# Patient Record
Sex: MALE | Race: WHITE | Employment: OTHER | URBAN - METROPOLITAN AREA
[De-identification: names, ages, dates, MRNs, and addresses within clinical notes are randomized per-mention and may not be internally consistent; named-entity substitution may affect disease eponyms.]

---

## 2021-08-09 ENCOUNTER — HOSPITAL ENCOUNTER (INPATIENT)
Age: 66
LOS: 2 days | Discharge: HOME | DRG: 156 | End: 2021-08-11
Attending: INTERNAL MEDICINE
Payer: COMMERCIAL

## 2021-08-09 DIAGNOSIS — L03.818 CELLULITIS OF OTHER SPECIFIED SITE: Primary | ICD-10-CM

## 2021-08-09 PROBLEM — L03.90 CELLULITIS: Status: ACTIVE | Noted: 2021-08-09

## 2021-08-09 LAB
ALBUMIN SERPL-MCNC: 4.4 G/DL (ref 3.5–5.2)
ALP BLD-CCNC: 101 U/L (ref 40–129)
ALT SERPL-CCNC: 22 U/L (ref 0–40)
ANION GAP SERPL CALCULATED.3IONS-SCNC: 14 MMOL/L (ref 7–16)
AST SERPL-CCNC: 33 U/L (ref 0–39)
BASOPHILS ABSOLUTE: 0.06 E9/L (ref 0–0.2)
BASOPHILS RELATIVE PERCENT: 0.5 % (ref 0–2)
BILIRUB SERPL-MCNC: 1 MG/DL (ref 0–1.2)
BUN BLDV-MCNC: 9 MG/DL (ref 6–23)
CALCIUM SERPL-MCNC: 9.3 MG/DL (ref 8.6–10.2)
CHLORIDE BLD-SCNC: 99 MMOL/L (ref 98–107)
CO2: 22 MMOL/L (ref 22–29)
CREAT SERPL-MCNC: 1.1 MG/DL (ref 0.7–1.2)
EOSINOPHILS ABSOLUTE: 0.12 E9/L (ref 0.05–0.5)
EOSINOPHILS RELATIVE PERCENT: 1 % (ref 0–6)
GFR AFRICAN AMERICAN: >60
GFR NON-AFRICAN AMERICAN: >60 ML/MIN/1.73
GLUCOSE BLD-MCNC: 129 MG/DL (ref 74–99)
HCT VFR BLD CALC: 46.8 % (ref 37–54)
HEMOGLOBIN: 16.6 G/DL (ref 12.5–16.5)
IMMATURE GRANULOCYTES #: 0.05 E9/L
IMMATURE GRANULOCYTES %: 0.4 % (ref 0–5)
LYMPHOCYTES ABSOLUTE: 1.64 E9/L (ref 1.5–4)
LYMPHOCYTES RELATIVE PERCENT: 13.9 % (ref 20–42)
MCH RBC QN AUTO: 31.9 PG (ref 26–35)
MCHC RBC AUTO-ENTMCNC: 35.5 % (ref 32–34.5)
MCV RBC AUTO: 90 FL (ref 80–99.9)
MONOCYTES ABSOLUTE: 0.64 E9/L (ref 0.1–0.95)
MONOCYTES RELATIVE PERCENT: 5.4 % (ref 2–12)
NEUTROPHILS ABSOLUTE: 9.29 E9/L (ref 1.8–7.3)
NEUTROPHILS RELATIVE PERCENT: 78.8 % (ref 43–80)
PDW BLD-RTO: 12.4 FL (ref 11.5–15)
PLATELET # BLD: 233 E9/L (ref 130–450)
PMV BLD AUTO: 10.1 FL (ref 7–12)
POTASSIUM SERPL-SCNC: 4.3 MMOL/L (ref 3.5–5)
RBC # BLD: 5.2 E12/L (ref 3.8–5.8)
SODIUM BLD-SCNC: 135 MMOL/L (ref 132–146)
TOTAL PROTEIN: 7.6 G/DL (ref 6.4–8.3)
WBC # BLD: 11.8 E9/L (ref 4.5–11.5)

## 2021-08-09 PROCEDURE — 96366 THER/PROPH/DIAG IV INF ADDON: CPT

## 2021-08-09 PROCEDURE — 6360000002 HC RX W HCPCS: Performed by: INTERNAL MEDICINE

## 2021-08-09 PROCEDURE — 85025 COMPLETE CBC W/AUTO DIFF WBC: CPT

## 2021-08-09 PROCEDURE — 96372 THER/PROPH/DIAG INJ SC/IM: CPT

## 2021-08-09 PROCEDURE — 87040 BLOOD CULTURE FOR BACTERIA: CPT

## 2021-08-09 PROCEDURE — 80053 COMPREHEN METABOLIC PANEL: CPT

## 2021-08-09 PROCEDURE — 2580000003 HC RX 258: Performed by: INTERNAL MEDICINE

## 2021-08-09 PROCEDURE — 1200000000 HC SEMI PRIVATE

## 2021-08-09 PROCEDURE — 96365 THER/PROPH/DIAG IV INF INIT: CPT

## 2021-08-09 PROCEDURE — G0378 HOSPITAL OBSERVATION PER HR: HCPCS

## 2021-08-09 PROCEDURE — G0379 DIRECT REFER HOSPITAL OBSERV: HCPCS

## 2021-08-09 PROCEDURE — 36415 COLL VENOUS BLD VENIPUNCTURE: CPT

## 2021-08-09 PROCEDURE — 6370000000 HC RX 637 (ALT 250 FOR IP): Performed by: INTERNAL MEDICINE

## 2021-08-09 RX ORDER — ONDANSETRON 2 MG/ML
4 INJECTION INTRAMUSCULAR; INTRAVENOUS EVERY 6 HOURS PRN
Status: DISCONTINUED | OUTPATIENT
Start: 2021-08-09 | End: 2021-08-11 | Stop reason: HOSPADM

## 2021-08-09 RX ORDER — ACETAMINOPHEN 325 MG/1
650 TABLET ORAL EVERY 6 HOURS PRN
Status: DISCONTINUED | OUTPATIENT
Start: 2021-08-09 | End: 2021-08-11 | Stop reason: HOSPADM

## 2021-08-09 RX ORDER — METOPROLOL SUCCINATE 100 MG/1
200 TABLET, EXTENDED RELEASE ORAL DAILY
COMMUNITY

## 2021-08-09 RX ORDER — LEVOFLOXACIN 500 MG/1
500 TABLET, FILM COATED ORAL DAILY
Status: ON HOLD | COMMUNITY
End: 2021-08-11 | Stop reason: HOSPADM

## 2021-08-09 RX ORDER — SODIUM CHLORIDE 0.9 % (FLUSH) 0.9 %
5-40 SYRINGE (ML) INJECTION EVERY 12 HOURS SCHEDULED
Status: DISCONTINUED | OUTPATIENT
Start: 2021-08-09 | End: 2021-08-11 | Stop reason: HOSPADM

## 2021-08-09 RX ORDER — TRAMADOL HYDROCHLORIDE 50 MG/1
50 TABLET ORAL EVERY 6 HOURS PRN
COMMUNITY
End: 2021-09-17

## 2021-08-09 RX ORDER — SODIUM CHLORIDE 9 MG/ML
25 INJECTION, SOLUTION INTRAVENOUS PRN
Status: DISCONTINUED | OUTPATIENT
Start: 2021-08-09 | End: 2021-08-11 | Stop reason: HOSPADM

## 2021-08-09 RX ORDER — POLYETHYLENE GLYCOL 3350 17 G/17G
17 POWDER, FOR SOLUTION ORAL DAILY PRN
Status: DISCONTINUED | OUTPATIENT
Start: 2021-08-09 | End: 2021-08-11 | Stop reason: HOSPADM

## 2021-08-09 RX ORDER — ACETAMINOPHEN 650 MG/1
650 SUPPOSITORY RECTAL EVERY 6 HOURS PRN
Status: DISCONTINUED | OUTPATIENT
Start: 2021-08-09 | End: 2021-08-11 | Stop reason: HOSPADM

## 2021-08-09 RX ORDER — ONDANSETRON 4 MG/1
4 TABLET, ORALLY DISINTEGRATING ORAL EVERY 8 HOURS PRN
Status: DISCONTINUED | OUTPATIENT
Start: 2021-08-09 | End: 2021-08-11 | Stop reason: HOSPADM

## 2021-08-09 RX ORDER — SODIUM CHLORIDE 0.9 % (FLUSH) 0.9 %
5-40 SYRINGE (ML) INJECTION PRN
Status: DISCONTINUED | OUTPATIENT
Start: 2021-08-09 | End: 2021-08-11 | Stop reason: HOSPADM

## 2021-08-09 RX ADMIN — Medication 10 ML: at 20:44

## 2021-08-09 RX ADMIN — ENOXAPARIN SODIUM 40 MG: 40 INJECTION SUBCUTANEOUS at 18:45

## 2021-08-09 RX ADMIN — VANCOMYCIN HYDROCHLORIDE 2500 MG: 5 INJECTION, POWDER, LYOPHILIZED, FOR SOLUTION INTRAVENOUS at 20:43

## 2021-08-09 RX ADMIN — ACETAMINOPHEN 650 MG: 325 TABLET ORAL at 21:15

## 2021-08-09 ASSESSMENT — PAIN SCALES - GENERAL
PAINLEVEL_OUTOF10: 7
PAINLEVEL_OUTOF10: 4

## 2021-08-09 ASSESSMENT — PAIN DESCRIPTION - PROGRESSION: CLINICAL_PROGRESSION: GRADUALLY WORSENING

## 2021-08-09 ASSESSMENT — PAIN DESCRIPTION - PAIN TYPE: TYPE: ACUTE PAIN

## 2021-08-09 ASSESSMENT — PAIN DESCRIPTION - ONSET: ONSET: ON-GOING

## 2021-08-09 ASSESSMENT — PAIN DESCRIPTION - FREQUENCY: FREQUENCY: CONTINUOUS

## 2021-08-09 ASSESSMENT — PAIN DESCRIPTION - ORIENTATION: ORIENTATION: RIGHT

## 2021-08-09 ASSESSMENT — PAIN - FUNCTIONAL ASSESSMENT: PAIN_FUNCTIONAL_ASSESSMENT: ACTIVITIES ARE NOT PREVENTED

## 2021-08-09 ASSESSMENT — PAIN DESCRIPTION - DESCRIPTORS: DESCRIPTORS: THROBBING

## 2021-08-09 ASSESSMENT — PAIN DESCRIPTION - LOCATION: LOCATION: EAR

## 2021-08-09 NOTE — PROGRESS NOTES
-Consulted to dose vancomycin for skin and soft tissue infection (of ear). -Serum creatinine not available at this time, however, loading dose of vancomycin 2500 mg IV x 1 would still be empirically appropriate.  -Will follow-up serum creatinine and renal function on 8/10.  -Even if renal function is normal, patient would not require additional vancomycin dosing prior to 1000 on 8/10.

## 2021-08-10 LAB
ALBUMIN SERPL-MCNC: 4.3 G/DL (ref 3.5–5.2)
ALP BLD-CCNC: 93 U/L (ref 40–129)
ALT SERPL-CCNC: 19 U/L (ref 0–40)
ANION GAP SERPL CALCULATED.3IONS-SCNC: 9 MMOL/L (ref 7–16)
ANTISTREPTOLYSIN-O: 45 IU/ML (ref 0–200)
AST SERPL-CCNC: 30 U/L (ref 0–39)
BASOPHILS ABSOLUTE: 0.07 E9/L (ref 0–0.2)
BASOPHILS RELATIVE PERCENT: 0.7 % (ref 0–2)
BILIRUB SERPL-MCNC: 1.3 MG/DL (ref 0–1.2)
BUN BLDV-MCNC: 8 MG/DL (ref 6–23)
C-REACTIVE PROTEIN: 0.4 MG/DL (ref 0–0.4)
CALCIUM SERPL-MCNC: 9.3 MG/DL (ref 8.6–10.2)
CHLORIDE BLD-SCNC: 102 MMOL/L (ref 98–107)
CO2: 26 MMOL/L (ref 22–29)
CREAT SERPL-MCNC: 1 MG/DL (ref 0.7–1.2)
EOSINOPHILS ABSOLUTE: 0.24 E9/L (ref 0.05–0.5)
EOSINOPHILS RELATIVE PERCENT: 2.5 % (ref 0–6)
GFR AFRICAN AMERICAN: >60
GFR NON-AFRICAN AMERICAN: >60 ML/MIN/1.73
GLUCOSE BLD-MCNC: 104 MG/DL (ref 74–99)
HCT VFR BLD CALC: 47.3 % (ref 37–54)
HEMOGLOBIN: 16.3 G/DL (ref 12.5–16.5)
IMMATURE GRANULOCYTES #: 0.04 E9/L
IMMATURE GRANULOCYTES %: 0.4 % (ref 0–5)
LYMPHOCYTES ABSOLUTE: 2.49 E9/L (ref 1.5–4)
LYMPHOCYTES RELATIVE PERCENT: 26 % (ref 20–42)
MCH RBC QN AUTO: 31.7 PG (ref 26–35)
MCHC RBC AUTO-ENTMCNC: 34.5 % (ref 32–34.5)
MCV RBC AUTO: 92 FL (ref 80–99.9)
MONOCYTES ABSOLUTE: 0.9 E9/L (ref 0.1–0.95)
MONOCYTES RELATIVE PERCENT: 9.4 % (ref 2–12)
NEUTROPHILS ABSOLUTE: 5.85 E9/L (ref 1.8–7.3)
NEUTROPHILS RELATIVE PERCENT: 61 % (ref 43–80)
PDW BLD-RTO: 12.4 FL (ref 11.5–15)
PLATELET # BLD: 215 E9/L (ref 130–450)
PMV BLD AUTO: 10.1 FL (ref 7–12)
POTASSIUM REFLEX MAGNESIUM: 4.3 MMOL/L (ref 3.5–5)
RBC # BLD: 5.14 E12/L (ref 3.8–5.8)
SEDIMENTATION RATE, ERYTHROCYTE: 1 MM/HR (ref 0–15)
SODIUM BLD-SCNC: 137 MMOL/L (ref 132–146)
TOTAL PROTEIN: 6.9 G/DL (ref 6.4–8.3)
WBC # BLD: 9.6 E9/L (ref 4.5–11.5)

## 2021-08-10 PROCEDURE — 85025 COMPLETE CBC W/AUTO DIFF WBC: CPT

## 2021-08-10 PROCEDURE — 80053 COMPREHEN METABOLIC PANEL: CPT

## 2021-08-10 PROCEDURE — 87070 CULTURE OTHR SPECIMN AEROBIC: CPT

## 2021-08-10 PROCEDURE — 96375 TX/PRO/DX INJ NEW DRUG ADDON: CPT

## 2021-08-10 PROCEDURE — 1200000000 HC SEMI PRIVATE

## 2021-08-10 PROCEDURE — 96376 TX/PRO/DX INJ SAME DRUG ADON: CPT

## 2021-08-10 PROCEDURE — 96372 THER/PROPH/DIAG INJ SC/IM: CPT

## 2021-08-10 PROCEDURE — 87205 SMEAR GRAM STAIN: CPT

## 2021-08-10 PROCEDURE — 86060 ANTISTREPTOLYSIN O TITER: CPT

## 2021-08-10 PROCEDURE — 6360000002 HC RX W HCPCS: Performed by: INTERNAL MEDICINE

## 2021-08-10 PROCEDURE — G0378 HOSPITAL OBSERVATION PER HR: HCPCS

## 2021-08-10 PROCEDURE — 6370000000 HC RX 637 (ALT 250 FOR IP): Performed by: OTOLARYNGOLOGY

## 2021-08-10 PROCEDURE — 6370000000 HC RX 637 (ALT 250 FOR IP): Performed by: NURSE PRACTITIONER

## 2021-08-10 PROCEDURE — 86140 C-REACTIVE PROTEIN: CPT

## 2021-08-10 PROCEDURE — 6370000000 HC RX 637 (ALT 250 FOR IP): Performed by: INTERNAL MEDICINE

## 2021-08-10 PROCEDURE — 2580000003 HC RX 258: Performed by: INTERNAL MEDICINE

## 2021-08-10 PROCEDURE — 6360000002 HC RX W HCPCS: Performed by: SPECIALIST

## 2021-08-10 PROCEDURE — 85651 RBC SED RATE NONAUTOMATED: CPT

## 2021-08-10 PROCEDURE — 36415 COLL VENOUS BLD VENIPUNCTURE: CPT

## 2021-08-10 RX ORDER — METOPROLOL SUCCINATE 100 MG/1
200 TABLET, EXTENDED RELEASE ORAL DAILY
Status: DISCONTINUED | OUTPATIENT
Start: 2021-08-10 | End: 2021-08-11 | Stop reason: HOSPADM

## 2021-08-10 RX ORDER — TRAMADOL HYDROCHLORIDE 50 MG/1
50 TABLET ORAL EVERY 6 HOURS PRN
Status: DISCONTINUED | OUTPATIENT
Start: 2021-08-10 | End: 2021-08-10

## 2021-08-10 RX ORDER — OXYCODONE HYDROCHLORIDE AND ACETAMINOPHEN 5; 325 MG/1; MG/1
1 TABLET ORAL EVERY 4 HOURS PRN
Status: DISCONTINUED | OUTPATIENT
Start: 2021-08-10 | End: 2021-08-10

## 2021-08-10 RX ORDER — TRAMADOL HYDROCHLORIDE 50 MG/1
50 TABLET ORAL EVERY 6 HOURS PRN
Status: DISCONTINUED | OUTPATIENT
Start: 2021-08-10 | End: 2021-08-11 | Stop reason: HOSPADM

## 2021-08-10 RX ORDER — LATANOPROST 50 UG/ML
1 SOLUTION/ DROPS OPHTHALMIC NIGHTLY
Status: DISCONTINUED | OUTPATIENT
Start: 2021-08-10 | End: 2021-08-11 | Stop reason: HOSPADM

## 2021-08-10 RX ORDER — OXYCODONE HYDROCHLORIDE AND ACETAMINOPHEN 5; 325 MG/1; MG/1
1 TABLET ORAL EVERY 4 HOURS PRN
Status: DISCONTINUED | OUTPATIENT
Start: 2021-08-10 | End: 2021-08-11 | Stop reason: HOSPADM

## 2021-08-10 RX ORDER — LATANOPROST 50 UG/ML
1 SOLUTION/ DROPS OPHTHALMIC NIGHTLY
COMMUNITY

## 2021-08-10 RX ADMIN — LATANOPROST 1 DROP: 50 SOLUTION OPHTHALMIC at 19:43

## 2021-08-10 RX ADMIN — TRAMADOL HYDROCHLORIDE 50 MG: 50 TABLET, FILM COATED ORAL at 15:50

## 2021-08-10 RX ADMIN — Medication 2000 MG: at 16:42

## 2021-08-10 RX ADMIN — Medication 10 ML: at 19:43

## 2021-08-10 RX ADMIN — MUPIROCIN: 20 OINTMENT TOPICAL at 19:42

## 2021-08-10 RX ADMIN — MUPIROCIN: 20 OINTMENT TOPICAL at 13:44

## 2021-08-10 RX ADMIN — TRAMADOL HYDROCHLORIDE 50 MG: 50 TABLET, FILM COATED ORAL at 09:24

## 2021-08-10 RX ADMIN — ACETAMINOPHEN 650 MG: 325 TABLET ORAL at 22:38

## 2021-08-10 RX ADMIN — OXYCODONE HYDROCHLORIDE AND ACETAMINOPHEN 1 TABLET: 5; 325 TABLET ORAL at 19:42

## 2021-08-10 RX ADMIN — Medication 10 ML: at 09:24

## 2021-08-10 RX ADMIN — METOPROLOL SUCCINATE 200 MG: 100 TABLET, EXTENDED RELEASE ORAL at 09:24

## 2021-08-10 RX ADMIN — ENOXAPARIN SODIUM 40 MG: 40 INJECTION SUBCUTANEOUS at 09:24

## 2021-08-10 RX ADMIN — Medication 2000 MG: at 09:31

## 2021-08-10 RX ADMIN — ACETAMINOPHEN 650 MG: 325 TABLET ORAL at 04:30

## 2021-08-10 ASSESSMENT — PAIN DESCRIPTION - PROGRESSION
CLINICAL_PROGRESSION: NOT CHANGED
CLINICAL_PROGRESSION: GRADUALLY WORSENING

## 2021-08-10 ASSESSMENT — PAIN DESCRIPTION - ONSET: ONSET: ON-GOING

## 2021-08-10 ASSESSMENT — PAIN SCALES - GENERAL
PAINLEVEL_OUTOF10: 0
PAINLEVEL_OUTOF10: 4
PAINLEVEL_OUTOF10: 7
PAINLEVEL_OUTOF10: 10
PAINLEVEL_OUTOF10: 3
PAINLEVEL_OUTOF10: 10
PAINLEVEL_OUTOF10: 7

## 2021-08-10 ASSESSMENT — PAIN DESCRIPTION - PAIN TYPE: TYPE: ACUTE PAIN

## 2021-08-10 ASSESSMENT — PAIN DESCRIPTION - LOCATION: LOCATION: EAR

## 2021-08-10 ASSESSMENT — PAIN DESCRIPTION - ORIENTATION: ORIENTATION: RIGHT

## 2021-08-10 ASSESSMENT — PAIN DESCRIPTION - DESCRIPTORS: DESCRIPTORS: THROBBING

## 2021-08-10 ASSESSMENT — PAIN DESCRIPTION - FREQUENCY: FREQUENCY: CONTINUOUS

## 2021-08-10 ASSESSMENT — PAIN - FUNCTIONAL ASSESSMENT: PAIN_FUNCTIONAL_ASSESSMENT: ACTIVITIES ARE NOT PREVENTED

## 2021-08-10 NOTE — PROGRESS NOTES
Pharmacy Consultation Note  (Antibiotic Dosing and Monitoring)    Note vancomycin discontinued. Clinical pharmacy will sign-off. Please re-consult if we can be of further assistance.     Thanks for this consult,  Cindi Lal, [address] [place] [address], PharmD, BCPS  8/10/2021  8:51 AM

## 2021-08-10 NOTE — CARE COORDINATION
Social Work / Discharge Planning : SW met with patient and explained role as discharge planner/ transition of care. Patient admitted with Cellulitis of ear. AWait ID consult and plan. Patient verified plan at discharge is HOME where he resides independently with sp[ouse. [address] vs [facility]fusion [facility] if needed at discharge discussed. He prefers [facility]. [address] choices dicussed and he does NOT have a preference. REferral to [facility]. Patient PCP is DR Dennys Vieyra and he uses [facility]. AWait plan. SW to follow.  Electronically signed by ISMAEL Hayes on 8/10/2021 at 9:10 AM

## 2021-08-10 NOTE — PATIENT CARE CONFERENCE
P Quality Flow/Interdisciplinary Rounds Progress Note        Quality Flow Rounds held on August 10, 2021    Disciplines Attending:  Bedside Nurse, ,  and Nursing Unit Leadership    Cindy Xavier was admitted on 8/9/2021  4:22 PM    Anticipated Discharge Date:  Expected Discharge Date: 08/11/21    Disposition:    Ricki Score:  Ricki Scale Score: 21    Readmission Risk              Risk of Unplanned Readmission:  6           Discussed patient goal for the day, patient clinical progression, and barriers to discharge.   The following Goal(s) of the Day/Commitment(s) have been identified:  ID consult       Omar Griggs RN  August 10, 2021

## 2021-08-10 NOTE — PROGRESS NOTES
over right auricular lesion. Needle aspiration of the inferior component, no pus. Attempted to express purulence from the superior aspect in the office yesterday, no fluid. Will re-eval daily. Per thought from Plastics, I&D?  Will consider if no relief from antibiotic therapy

## 2021-08-10 NOTE — PROGRESS NOTES
Spoke w/Dr. Maxx Jimenez  He's direct admitting patient from ENT clinic for spontaneous ear cellulitis / chondritis  No prior history of chondritis  Afebrile, AVSS  He is requesting ID be involved

## 2021-08-10 NOTE — HOME CARE
Referral received for home health possible home iv's. Pricing as follow:     Ordered therapy at time of quote: CEFAZOLIN 2GM IV Q8H  Coverage: 75%  Total pt responsibility: $39.73/DAY    Patient agrees with pricing. If home health is needed patient will need home iv script faxed to [phone] and home health orders placed in Epic prior to discharge.      Thank you, [facility]

## 2021-08-10 NOTE — H&P
[address] [facility]  History and Physical      CHIEF COMPLAINT:    Cellulitis    Patient of Maria T Barber MD presents with:  Cellulitis  History of Present Illness:   Patient is a 81-year-old male who presents with cellulitis of the right ear. Patient states this began 4 weeks ago as a small red pimple that progressively got worse. Patient identifies no aggravating or relieving factors. Patient was seen twice by his PCP and was on 2 different antibiotics however symptoms progressively got worse. Infectious disease and otolaryngology following. Cultures pending. At present, patient is resting in bed, complains of pain in the right ear. Purulent drainage noted from right ear. Patient denies chest pain, shortness of breath, fever, chills, or abdominal pain. REVIEW OF SYSTEMS:  Pertinent negatives are above in HPI. 10 point ROS otherwise negative. Past Medical History:   Diagnosis Date    Hypertension          Past Surgical History:   Procedure Laterality Date    HERNIA REPAIR  1977       Medications Prior to Admission:    Medications Prior to Admission: latanoprost (XALATAN) 0.005 % ophthalmic solution, Place 1 drop into both eyes nightly  metoprolol succinate (TOPROL XL) 100 MG extended release tablet, Take 200 mg by mouth daily In am  levoFLOXacin (LEVAQUIN) 500 MG tablet, Take 500 mg by mouth daily For current infection (prescribed by PCP)  traMADol (ULTRAM) 50 MG tablet, Take 50 mg by mouth every 6 hours as needed for Pain. For pain d/t current infection (prescribed by PCP)    Note that the patient's home medications were reviewed and the above list is accurate to the best of my knowledge at the time of the exam.    Allergies:    Patient has no known allergies. Social History:    reports that he has never smoked. He has never used smokeless tobacco. He reports current alcohol use of about 1.0 standard drinks of alcohol per week.  He reports that he does not use drugs. Family History:   family history is not on file. PHYSICAL EXAM:    Vitals:  BP (!) 122/91   Pulse 83   Temp 98.2 °F (36.8 °C) (Oral)   Resp 18   Ht 5' 10\" (1.778 m)   Wt 227 lb 5 oz (103.1 kg)   SpO2 96%   BMI 32.62 kg/m²       General appearance: NAD, conversant  Eyes: Sclerae anicteric, PERRLA  HEENT: R ear tenderness, swollen-cellulitis  Neck: FROM, supple, no thyromegaly  Lymph: No cervical / supraclavicular lymphadenopathy  Lungs: Clear to auscultation, WOB normal  CV: RRR, no MRGs, no lower extremity edema  Abdomen: Soft, non-tender; no masses or HSM, +BS  Extremities: FROM without synovitis. No clubbing or cyanosis of the hands. Skin: no rash, induration, lesions, or ulcers  Psych: Calm and cooperative. Normal judgement and insight. Normal mood and affect. Neuro: Alert and interactive, face symmetric, speech fluent. LABS:  All labs reviewed. Of note:  CBC with Differential:    Lab Results   Component Value Date    WBC 9.6 08/10/2021    RBC 5.14 08/10/2021    HGB 16.3 08/10/2021    HCT 47.3 08/10/2021     08/10/2021    MCV 92.0 08/10/2021    MCH 31.7 08/10/2021    MCHC 34.5 08/10/2021    RDW 12.4 08/10/2021    LYMPHOPCT 26.0 08/10/2021    MONOPCT 9.4 08/10/2021    BASOPCT 0.7 08/10/2021    MONOSABS 0.90 08/10/2021    LYMPHSABS 2.49 08/10/2021    EOSABS 0.24 08/10/2021    BASOSABS 0.07 08/10/2021     Hemoglobin/Hematocrit:    Lab Results   Component Value Date    HGB 16.3 08/10/2021    HCT 47.3 08/10/2021       Telemetry:  I've personally reviewed the patient's telemetry: NSR      ASSESSMENT/PLAN:  Active Problems:    Cellulitis  Resolved Problems:    * No resolved hospital problems.  *    Continue Ancef and Bactroban  Infectious disease and otolaryngology following  Cultures pending  May require debridement  Supportive care  Pain control    Code status: FULL  Requires inpatient level of care  JCARLOS Maldonado - CNP    4:38 PM  8/10/2021     Right pinna is a large mass  Status post needle aspiration by ENT  Will likely need incision and drainage  Empiric IV antibiotic initiated by infectious disease  Resume home medications  Check A1c  Discharge plan once transition to p.o. antibiotics      I personally saw, examined and provided care for the patient. Radiographs, labs and medication list were reviewed by me independently. The case was discussed in detail and plans for care were established. Review of JCARLOS Alcantara - CNP, documentation was conducted and revisions were made as appropriate directly by me. I agree with the above documented exam, problem list, and plan of care.      Madie Chavez MD  5:42 PM  8/10/2021

## 2021-08-10 NOTE — CONSULTS
[address] [place] [address] [facility]  Consultation Note     Admit Date: 8/9/2021  4:22 PM    Reason for Consult:   Auricular cellulitis/chondritis    Attending Physician:  Ashish Rouse MD    HISTORY OF PRESENT ILLNESS:             The history is obtained from extensive review of available past medical records. The patient is a 60 y.o. male who is not previously known to ID. The patient wears earrings and tends to take them out and clean them regularly. For about a month he noticed some swelling on the earlobe on the right side. He decided to take off the earrings and noticed that the swelling, redness and pain progressed down towards the lobe and anteriorly. Had not worked its way towards the top of the pinna or posteriorly. He did not have any fevers, chills or any systemic signs of infection. About a week prior to the admission he noted that it grew significantly and was more tender without any drainage. He went to see ENT and had a needle inserted in the upper part of the lesion but nothing came out. He was sent to the hospital for an admission and was started on Vancomycin. A needle was inserted in the lower part of the earlobe and no fluid was obtained.     Past Medical History:        Diagnosis Date    Hypertension      Past Surgical History:        Procedure Laterality Date    HERNIA REPAIR  1977     Current Medications:   Scheduled Meds:   ceFAZolin  2,000 mg Intravenous Q8H    latanoprost  1 drop Both Eyes Nightly    metoprolol succinate  200 mg Oral Daily    mupirocin   Topical TID    sodium chloride flush  5-40 mL Intravenous 2 times per day    enoxaparin  40 mg Subcutaneous Daily    vancomycin (VANCOCIN) intermittent dosing (placeholder)   Other RX Placeholder     Continuous Infusions:   sodium chloride       PRN Meds:traMADol, sodium chloride flush, sodium chloride, ondansetron **OR** ondansetron, polyethylene glycol, acetaminophen **OR** acetaminophen    Allergies: Patient has no known allergies. Social History:   Social History     Socioeconomic History    Marital status:      Spouse name: Not on file    Number of children: Not on file    Years of education: Not on file    Highest education level: Not on file   Occupational History    Not on file   Tobacco Use    Smoking status: Never Smoker    Smokeless tobacco: Never Used   Vaping Use    Vaping Use: Never used   Substance and Sexual Activity    Alcohol use: Yes     Alcohol/week: 1.0 standard drinks     Types: 1 Cans of beer per week    Drug use: Never    Sexual activity: Yes     Partners: Female   Other Topics Concern    Not on file   Social History Narrative    Not on file     Social Determinants of Health     Financial Resource Strain:     Difficulty of Paying Living Expenses:    Food Insecurity:     Worried About Running Out of Food in the Last Year:     [address] in the Last Year:    Transportation Needs:     Lack of Transportation (Medical):  Lack of Transportation (Non-Medical):    Physical Activity:     Days of Exercise per Week:     Minutes of Exercise per Session:    Stress:     Feeling of Stress :    Social Connections:     Frequency of Communication with Friends and Family:     Frequency of Social Gatherings with Friends and Family:     Attends [employer] Services:     Active Member of Clubs or Organizations:     Attends Club or Organization Meetings:     Marital Status:    Intimate Partner Violence:     Fear of Current or Ex-Partner:     Emotionally Abused:     Physically Abused:     Sexually Abused:       Pets: None  Travel: None  The patient lives at home with his wife. He retired    Family History:   No family history on file. . Otherwise non-pertinent to the chief complaint.     REVIEW OF SYSTEMS:    Constitutional: Negative for fevers, chills, diaphoresis  Neurologic: Negative   Psychiatric: Negative  Rheumatologic: Negative   Endocrine: Negative  Hematologic: Negative  Immunologic: Negative  ENT: As in the HPI  Respiratory: Negative   Cardiovascular: Negative  GI: Negative  : Negative  Musculoskeletal: Negative  Skin: No rashes. PHYSICAL EXAM:    Vitals:   BP (!) 122/91   Pulse 83   Temp 98.2 °F (36.8 °C) (Oral)   Resp 18   Ht 5' 10\" (1.778 m)   Wt 227 lb 5 oz (103.1 kg)   SpO2 96%   BMI 32.62 kg/m²   Constitutional: The patient is awake, alert, and oriented. He is ambulating. No distress. Wife present. Skin: Warm and dry. No rashes were noted. HEENT: Eyes show round, and reactive pupils. No jaundice. Moist mucous membranes, no ulcerations, no thrush. There is a lesion on the lower lobe anteriorly. There are 2 puncture wounds above and below the lesion. I was able to express blood/pus from the top lesion. The EAC is unremarkable  Neck: Supple to movements. No lymphadenopathy. Chest: No use of accessory muscles to breathe. Symmetrical expansion. Auscultation reveals no wheezing, crackles, or rhonchi. Cardiovascular: S1 and S2 are rhythmic and regular. No murmurs appreciated. Abdomen: Positive bowel sounds to auscultation. Benign to palpation. No masses felt. No hepatosplenomegaly. Extremities: No clubbing, no cyanosis, no edema. Lines: peripheral          CBC+dif:  Recent Labs     08/09/21  1830 08/09/21  1830 08/10/21  0347   WBC 11.8*  --  9.6   HGB 16.6*   < > 16.3   HCT 46.8   < > 47.3   MCV 90.0   < > 92.0      < > 215   NEUTROABS 9.29*   < > 5.85    < > = values in this interval not displayed.      No results found for: CRP   No results found for: CRP  Lab Results   Component Value Date    SEDRATE 1 08/10/2021     Lab Results   Component Value Date    ALT 19 08/10/2021    AST 30 08/10/2021    ALKPHOS 93 08/10/2021    BILITOT 1.3 (H) 08/10/2021     Lab Results   Component Value Date     08/10/2021    K 4.3 08/10/2021     08/10/2021    CO2 26 08/10/2021    BUN 8 08/10/2021    CREATININE 1.0 08/10/2021    GFRAA >60 08/10/2021    LABGLOM >60 08/10/2021    GLUCOSE 104 08/10/2021    PROT 6.9 08/10/2021    LABALBU 4.3 08/10/2021    CALCIUM 9.3 08/10/2021    BILITOT 1.3 08/10/2021    ALKPHOS 93 08/10/2021    AST 30 08/10/2021    ALT 19 08/10/2021       No results found for: PROTIME, INR    No results found for: TSH    No results found for: NITRITE, COLORU, PHUR, LABCAST, WBCUA, RBCUA, MUCUS, TRICHOMONAS, YEAST, BACTERIA, CLARITYU, SPECGRAV, LEUKOCYTESUR, UROBILINOGEN, BILIRUBINUR, BLOODU, GLUCOSEU, AMORPHOUS    No results found for: HCO3, BE, O2SAT, PH, THGB, PCO2, PO2, TCO2  Radiology:  Noted    Microbiology:  Pending  No results for input(s): BC in the last 72 hours. No results for input(s): ORG in the last 72 hours. No results for input(s): Kristan Holes in the last 72 hours. No results for input(s): STREPNEUMAGU in the last 72 hours. No results for input(s): LP1UAG in the last 72 hours. No results for input(s): ASO in the last 72 hours. No results for input(s): CULTRESP in the last 72 hours. No results for input(s): PROCAL in the last 72 hours. Assessment:  · Right earlobe abscess    Plan:    · Continue Cefazolin  · Check Gram stain and cultures cultures, baseline ESR, CRP  · The patient will most likely require debridement  · Will follow with you    Thank you for having us see this patient in consultation.   The case was discussed with Dr. Phoenix Bashir MD  2:06 PM  8/10/2021

## 2021-08-11 VITALS
TEMPERATURE: 98.3 F | BODY MASS INDEX: 32.54 KG/M2 | SYSTOLIC BLOOD PRESSURE: 150 MMHG | DIASTOLIC BLOOD PRESSURE: 91 MMHG | HEIGHT: 70 IN | WEIGHT: 227.31 LBS | HEART RATE: 89 BPM | OXYGEN SATURATION: 95 % | RESPIRATION RATE: 18 BRPM

## 2021-08-11 LAB
ALBUMIN SERPL-MCNC: 3.9 G/DL (ref 3.5–5.2)
ALP BLD-CCNC: 94 U/L (ref 40–129)
ALT SERPL-CCNC: 20 U/L (ref 0–40)
ANION GAP SERPL CALCULATED.3IONS-SCNC: 11 MMOL/L (ref 7–16)
AST SERPL-CCNC: 35 U/L (ref 0–39)
BASOPHILS ABSOLUTE: 0.06 E9/L (ref 0–0.2)
BASOPHILS RELATIVE PERCENT: 0.7 % (ref 0–2)
BILIRUB SERPL-MCNC: 1.1 MG/DL (ref 0–1.2)
BUN BLDV-MCNC: 9 MG/DL (ref 6–23)
CALCIUM SERPL-MCNC: 8.8 MG/DL (ref 8.6–10.2)
CHLORIDE BLD-SCNC: 103 MMOL/L (ref 98–107)
CO2: 24 MMOL/L (ref 22–29)
CREAT SERPL-MCNC: 1 MG/DL (ref 0.7–1.2)
EOSINOPHILS ABSOLUTE: 0.22 E9/L (ref 0.05–0.5)
EOSINOPHILS RELATIVE PERCENT: 2.5 % (ref 0–6)
GFR AFRICAN AMERICAN: >60
GFR NON-AFRICAN AMERICAN: >60 ML/MIN/1.73
GLUCOSE BLD-MCNC: 103 MG/DL (ref 74–99)
GRAM STAIN ORDERABLE: NORMAL
HCT VFR BLD CALC: 47.6 % (ref 37–54)
HEMOGLOBIN: 16.1 G/DL (ref 12.5–16.5)
IMMATURE GRANULOCYTES #: 0.03 E9/L
IMMATURE GRANULOCYTES %: 0.3 % (ref 0–5)
LYMPHOCYTES ABSOLUTE: 2.55 E9/L (ref 1.5–4)
LYMPHOCYTES RELATIVE PERCENT: 28.6 % (ref 20–42)
MCH RBC QN AUTO: 31.5 PG (ref 26–35)
MCHC RBC AUTO-ENTMCNC: 33.8 % (ref 32–34.5)
MCV RBC AUTO: 93.2 FL (ref 80–99.9)
MONOCYTES ABSOLUTE: 0.86 E9/L (ref 0.1–0.95)
MONOCYTES RELATIVE PERCENT: 9.7 % (ref 2–12)
NEUTROPHILS ABSOLUTE: 5.19 E9/L (ref 1.8–7.3)
NEUTROPHILS RELATIVE PERCENT: 58.2 % (ref 43–80)
PDW BLD-RTO: 12.7 FL (ref 11.5–15)
PLATELET # BLD: 192 E9/L (ref 130–450)
PMV BLD AUTO: 9.8 FL (ref 7–12)
POTASSIUM SERPL-SCNC: 4 MMOL/L (ref 3.5–5)
RBC # BLD: 5.11 E12/L (ref 3.8–5.8)
SODIUM BLD-SCNC: 138 MMOL/L (ref 132–146)
TOTAL PROTEIN: 6.9 G/DL (ref 6.4–8.3)
WBC # BLD: 8.9 E9/L (ref 4.5–11.5)

## 2021-08-11 PROCEDURE — 36415 COLL VENOUS BLD VENIPUNCTURE: CPT

## 2021-08-11 PROCEDURE — 96376 TX/PRO/DX INJ SAME DRUG ADON: CPT

## 2021-08-11 PROCEDURE — 6360000002 HC RX W HCPCS: Performed by: SPECIALIST

## 2021-08-11 PROCEDURE — 85025 COMPLETE CBC W/AUTO DIFF WBC: CPT

## 2021-08-11 PROCEDURE — 6370000000 HC RX 637 (ALT 250 FOR IP): Performed by: INTERNAL MEDICINE

## 2021-08-11 PROCEDURE — 96372 THER/PROPH/DIAG INJ SC/IM: CPT

## 2021-08-11 PROCEDURE — 6370000000 HC RX 637 (ALT 250 FOR IP): Performed by: NURSE PRACTITIONER

## 2021-08-11 PROCEDURE — 6360000002 HC RX W HCPCS: Performed by: INTERNAL MEDICINE

## 2021-08-11 PROCEDURE — 6370000000 HC RX 637 (ALT 250 FOR IP): Performed by: REGISTERED NURSE

## 2021-08-11 PROCEDURE — 2580000003 HC RX 258: Performed by: INTERNAL MEDICINE

## 2021-08-11 PROCEDURE — G0378 HOSPITAL OBSERVATION PER HR: HCPCS

## 2021-08-11 PROCEDURE — 0990XZZ DRAINAGE OF RIGHT EXTERNAL EAR, EXTERNAL APPROACH: ICD-10-PCS | Performed by: OTOLARYNGOLOGY

## 2021-08-11 PROCEDURE — 80053 COMPREHEN METABOLIC PANEL: CPT

## 2021-08-11 RX ORDER — CEPHALEXIN 500 MG/1
1000 CAPSULE ORAL EVERY 8 HOURS SCHEDULED
Status: DISCONTINUED | OUTPATIENT
Start: 2021-08-11 | End: 2021-08-11 | Stop reason: HOSPADM

## 2021-08-11 RX ORDER — CEPHALEXIN 500 MG/1
1000 CAPSULE ORAL EVERY 8 HOURS SCHEDULED
Qty: 60 CAPSULE | Refills: 0 | Status: SHIPPED | OUTPATIENT
Start: 2021-08-11 | End: 2021-08-21

## 2021-08-11 RX ADMIN — Medication 10 ML: at 09:34

## 2021-08-11 RX ADMIN — CEPHALEXIN 1000 MG: 500 CAPSULE ORAL at 14:51

## 2021-08-11 RX ADMIN — MUPIROCIN: 20 OINTMENT TOPICAL at 09:32

## 2021-08-11 RX ADMIN — OXYCODONE HYDROCHLORIDE AND ACETAMINOPHEN 1 TABLET: 5; 325 TABLET ORAL at 01:21

## 2021-08-11 RX ADMIN — METOPROLOL SUCCINATE 200 MG: 100 TABLET, EXTENDED RELEASE ORAL at 09:32

## 2021-08-11 RX ADMIN — Medication 2000 MG: at 01:21

## 2021-08-11 RX ADMIN — OXYCODONE HYDROCHLORIDE AND ACETAMINOPHEN 1 TABLET: 5; 325 TABLET ORAL at 09:32

## 2021-08-11 RX ADMIN — MUPIROCIN: 20 OINTMENT TOPICAL at 13:27

## 2021-08-11 RX ADMIN — ACETAMINOPHEN 650 MG: 325 TABLET ORAL at 06:05

## 2021-08-11 RX ADMIN — ENOXAPARIN SODIUM 40 MG: 40 INJECTION SUBCUTANEOUS at 09:32

## 2021-08-11 RX ADMIN — Medication 2000 MG: at 09:33

## 2021-08-11 ASSESSMENT — PAIN SCALES - GENERAL
PAINLEVEL_OUTOF10: 6
PAINLEVEL_OUTOF10: 0
PAINLEVEL_OUTOF10: 5
PAINLEVEL_OUTOF10: 4

## 2021-08-11 NOTE — PROGRESS NOTES
[address] [place] [address] [facility]  Progress Note    SUBJECTIVE:  CC: right ear lobe growth     Patient is tolerating medications. No nausea, vomiting, diarrhea, fevers, chills. Right ear lobe growth is unchanged     Review of systems:  As stated above in the chief complaint, otherwise negative. Medications:  Scheduled Meds:   ceFAZolin  2,000 mg Intravenous Q8H    latanoprost  1 drop Both Eyes Nightly    metoprolol succinate  200 mg Oral Daily    mupirocin   Topical TID    sodium chloride flush  5-40 mL Intravenous 2 times per day    enoxaparin  40 mg Subcutaneous Daily     Continuous Infusions:   sodium chloride       PRN Meds:traMADol, oxyCODONE-acetaminophen, sodium chloride flush, sodium chloride, ondansetron **OR** ondansetron, polyethylene glycol, acetaminophen **OR** acetaminophen    OBJECTIVE:  BP (!) 150/91   Pulse 89   Temp 98.3 °F (36.8 °C) (Oral)   Resp 18   Ht 5' 10\" (1.778 m)   Wt 227 lb 5 oz (103.1 kg)   SpO2 95%   BMI 32.62 kg/m²   Temp  Av.1 °F (36.7 °C)  Min: 97.8 °F (36.6 °C)  Max: 98.3 °F (36.8 °C)  Constitutional: The patient is awake, alert, and oriented. Sitting up in bed, in NAD  Skin: Warm and dry. No rashes were noted. HEENT: Round and reactive pupils. Moist mucous membranes. No ulcerations or thrush. Right ear lobe lesion - currently packed after bedside procedure. Reddish/purple in color   Neck: Supple to movements. Chest: No use of accessory muscles to breathe. Symmetrical expansion. No wheezing, crackles or rhonchi. Cardiovascular: S1 and S2 are rhythmic and regular. No murmurs appreciated. Abdomen: Positive bowel sounds to auscultation. Benign to palpation. No masses felt. Extremities: No edema.   Lines: peripheral    Laboratory and Tests Review:  Lab Results   Component Value Date    WBC 8.9 2021    WBC 9.6 08/10/2021    WBC 11.8 (H) 2021    HGB 16.1 2021    HCT 47.6 2021    MCV 93.2 2021     08/11/2021     Lab Results   Component Value Date    NEUTROABS 5.19 08/11/2021    NEUTROABS 5.85 08/10/2021    NEUTROABS 9.29 (H) 08/09/2021     No results found for: CRP  Lab Results   Component Value Date    ALT 20 08/11/2021    AST 35 08/11/2021    ALKPHOS 94 08/11/2021    BILITOT 1.1 08/11/2021     Lab Results   Component Value Date     08/11/2021    K 4.0 08/11/2021    K 4.3 08/10/2021     08/11/2021    CO2 24 08/11/2021    BUN 9 08/11/2021    CREATININE 1.0 08/11/2021    CREATININE 1.0 08/10/2021    CREATININE 1.1 08/09/2021    GFRAA >60 08/11/2021    LABGLOM >60 08/11/2021    GLUCOSE 103 08/11/2021    PROT 6.9 08/11/2021    LABALBU 3.9 08/11/2021    CALCIUM 8.8 08/11/2021    BILITOT 1.1 08/11/2021    ALKPHOS 94 08/11/2021    AST 35 08/11/2021    ALT 20 08/11/2021     Lab Results   Component Value Date    CRP 0.4 08/10/2021     Lab Results   Component Value Date    SEDRATE 1 08/10/2021     Radiology:  None     Microbiology:   Blood cultures 8/9/2021: negative so far  Wound cx earlobe: no organisms seen, growth not present. ASSESSMENT:  · Right earlobe abscess, bedside I&D done 8/11/2021 - no purulence noted   · Possible neoplasm right ear lobe    · Leukocytosis, resolved. PLAN:  · Continue Cefazolin for now   · ENT following - bedside I&D done today, planning for outpatient biopsy & resection   · Check final cultures  · Monitor labs- ESR 1, CRP 0.4, WBC 8.9     JCARLOS Brand - CNP  11:27 AM  8/11/2021     Patient seen and examined. I had a face to face encounter with the patient. Agree with exam.  Assessment and plan as outlined above and directed by me. Addition and corrections were done as deemed appropriate. My exam and plan include: The patient is complaining of pain. He earlobe was debrided. No purulence was found. Cultures are negative so far. We will go ahead and change Cefazolin over to Cephalexin x10 days. The patient can be discharged.   He was instructed to call the

## 2021-08-11 NOTE — PROGRESS NOTES
Subjective: The patient is awake and alert. No acute events overnight, bedside I&D today  Denies chest pain, angina, SOB       Objective:    BP (!) 150/91   Pulse 89   Temp 98.3 °F (36.8 °C) (Oral)   Resp 18   Ht 5' 10\" (1.778 m)   Wt 227 lb 5 oz (103.1 kg)   SpO2 95%   BMI 32.62 kg/m²     No intake/output data recorded. No intake/output data recorded. General appearance: NAD, conversant  HEENT: AT/NC, MMM  Neck: FROM, supple  Lungs: Clear to auscultation  CV: RRR, no MRGs  Vasc: Radial pulses 2+  Abdomen: Soft, non-tender; no masses or HSM  Extremities: No peripheral edema or digital cyanosis  Skin: no rash, lesions or ulcers  Psych: Alert and oriented to person, place and time  Neuro: Alert and interactive     Recent Labs     08/09/21  1830 08/10/21  0347 08/11/21  0415   WBC 11.8* 9.6 8.9   HGB 16.6* 16.3 16.1   HCT 46.8 47.3 47.6    215 192       Recent Labs     08/09/21  1830 08/10/21  0347 08/11/21  0415    137 138   K 4.3 4.3 4.0   CL 99 102 103   CO2 22 26 24   BUN 9 8 9   CREATININE 1.1 1.0 1.0   CALCIUM 9.3 9.3 8.8       Assessment:    Active Problems:    Cellulitis  Resolved Problems:    * No resolved hospital problems. *      Plan:    Continue Ancef and Bactroban  Infectious disease and otolaryngology following  Cultures pending  Bedside I&D today-no purulent fluid, minimal bleeding-?  neoplasm vs acute infectious process  Plan for outpatient biopsy/resection  Discharge plan once transition to p.o. antibiotics  Supportive care  Pain control     JCARLOS Carolina CNP  12:05 PM  8/11/2021     Likely neoplasm   Ok for dc from medicine on po abx      I personally saw, examined and provided care for the patient. Radiographs, labs and medication list were reviewed by me independently. The case was discussed in detail and plans for care were established.  Review of JCARLOS Carolina CNP, documentation was conducted and revisions were made as appropriate directly by me. I agree with the above documented exam, problem list, and plan of care.      Alena Gitelman, MD  8/11/2021

## 2021-08-11 NOTE — PROGRESS NOTES
No significant change. Right auricle prepped. Localized with Xylo. 1 cm incision along superior portion of the swelling. No purulent fluid. No cystic are encountered. Actually had minimal bleeding. No necroric tissue    With minimal response to antibiotic, sharply defined posterior border with sparing of the bulk of the conchal bowl and superior border, this may be a neoplasm vs acute infectious process. Resection would result in defect of the auricle. Discussed with pt. May consider conversion to po antibiotic and will schedule for biopsy/resetion as outpt.

## 2021-08-11 NOTE — DISCHARGE SUMMARY
Physician Discharge Summary     Patient ID:  Parth Schofield  [id number]  14 y.o.  1955    Admit date: 8/9/2021    Discharge date and time: 8/11/2021    Admission Diagnoses:   Patient Active Problem List   Diagnosis    Cellulitis       Discharge Diagnoses: Cellulitis    Consults: ID, ENT    Procedures: I&D    Hospital Course: The patient is a 60 y.o. male of Steffany Hernandez MD with significant past medical history of hypertension who presents with cellulitis of the right ear. Patient was seen by his PCP and trialed on 2 different antibiotics however symptoms progressively got worse. I&D at bedside today revealed no purulent fluid and minimal bleeding. IV antibiotics conversed to p.o. antibiotic. Patient to schedule outpatient biopsy/resection with ENT. Patient discharged home in stable condition. Advised to follow-up with PCP and consultants within 2 weeks. Recent Labs     08/09/21  1830 08/10/21  0347 08/11/21  0415   WBC 11.8* 9.6 8.9   HGB 16.6* 16.3 16.1   HCT 46.8 47.3 47.6    215 192       Recent Labs     08/09/21  1830 08/10/21  0347 08/11/21  0415    137 138   K 4.3 4.3 4.0   CL 99 102 103   CO2 22 26 24   BUN 9 8 9   CREATININE 1.1 1.0 1.0   CALCIUM 9.3 9.3 8.8       No results found. Discharge Exam:    HEENT: R ear mass  Heart:  RRR, no murmurs, gallops, or rubs.   Lungs:  CTA bilaterally, no wheeze, rales or rhonchi  Abd: bowel sounds present, nontender, nondistended, no masses  Extrem:  No clubbing, cyanosis, or edema    Disposition: home     Patient Condition at Discharge: stable    Patient Instructions:      Medication List      START taking these medications    cephALEXin 500 MG capsule  Commonly known as: KEFLEX  Take 2 capsules by mouth every 8 hours for 10 days        CONTINUE taking these medications    latanoprost 0.005 % ophthalmic solution  Commonly known as: XALATAN     metoprolol succinate 100 MG extended release tablet  Commonly known as: TOPROL XL     traMADol 50 MG tablet  Commonly known as: ULTRAM        STOP taking these medications    levoFLOXacin 500 MG tablet  Commonly known as: LEVAQUIN           Where to Get Your Medications      These medications were sent to [address] [facility],[address], [address] [facility] [place] [phone] [address] [facility]    Phone: [phone]   · cephALEXin 500 MG capsule       Activity: activity as tolerated  Diet: regular diet    Pt has been advised to: Follow-up with Juve Pacheco MD in 1 week. Follow-up with consultants as recommended by them    Note that over 30 minutes was spent in preparing discharge papers, discussing discharge with patient, medication review, etc.    Signed:  JCARLOS Carolina CNP  8/11/2021  2:36 PM     Quincy Nichols for dc follow with pcp to re assess   Follow with ENT for bx results / hemonc     I personally saw, examined and provided care for the patient. Radiographs, labs and medication list were reviewed by me independently. The case was discussed in detail and plans for care were established. Review of JCARLOS Carolina CNP, documentation was conducted and revisions were made as appropriate directly by me. I agree with the above documented exam, problem list, and plan of care.      Pepe Luna MD  8/11/2021

## 2021-08-13 LAB — WOUND/ABSCESS: NORMAL

## 2021-08-14 LAB
BLOOD CULTURE, ROUTINE: NORMAL
CULTURE, BLOOD 2: NORMAL

## 2021-08-15 PROBLEM — H93.8X1: Status: ACTIVE | Noted: 2021-08-15

## 2021-08-17 ENCOUNTER — HOSPITAL ENCOUNTER (EMERGENCY)
Age: 66
Discharge: HOME | End: 2021-08-17
Attending: EMERGENCY MEDICINE
Payer: COMMERCIAL

## 2021-08-17 ENCOUNTER — APPOINTMENT (OUTPATIENT)
Dept: GENERAL RADIOLOGY | Age: 66
End: 2021-08-17
Payer: COMMERCIAL

## 2021-08-17 ENCOUNTER — ANESTHESIA EVENT (OUTPATIENT)
Dept: OPERATING ROOM | Age: 66
End: 2021-08-17

## 2021-08-17 ENCOUNTER — ANESTHESIA (OUTPATIENT)
Dept: OPERATING ROOM | Age: 66
End: 2021-08-17

## 2021-08-17 VITALS
SYSTOLIC BLOOD PRESSURE: 148 MMHG | DIASTOLIC BLOOD PRESSURE: 115 MMHG | RESPIRATION RATE: 16 BRPM | OXYGEN SATURATION: 98 % | TEMPERATURE: 98.5 F | HEART RATE: 96 BPM

## 2021-08-17 DIAGNOSIS — I49.9 IRREGULAR CARDIAC RHYTHM: Primary | ICD-10-CM

## 2021-08-17 LAB
ANION GAP SERPL CALCULATED.3IONS-SCNC: 12 MMOL/L (ref 7–16)
BASOPHILS ABSOLUTE: 0.08 E9/L (ref 0–0.2)
BASOPHILS RELATIVE PERCENT: 0.7 % (ref 0–2)
BUN BLDV-MCNC: 12 MG/DL (ref 6–23)
CALCIUM SERPL-MCNC: 9 MG/DL (ref 8.6–10.2)
CHLORIDE BLD-SCNC: 103 MMOL/L (ref 98–107)
CO2: 23 MMOL/L (ref 22–29)
CREAT SERPL-MCNC: 1 MG/DL (ref 0.7–1.2)
EOSINOPHILS ABSOLUTE: 0.14 E9/L (ref 0.05–0.5)
EOSINOPHILS RELATIVE PERCENT: 1.2 % (ref 0–6)
GFR AFRICAN AMERICAN: >60
GFR NON-AFRICAN AMERICAN: >60 ML/MIN/1.73
GLUCOSE BLD-MCNC: 121 MG/DL (ref 74–99)
HCT VFR BLD CALC: 47.3 % (ref 37–54)
HEMOGLOBIN: 16.2 G/DL (ref 12.5–16.5)
IMMATURE GRANULOCYTES #: 0.06 E9/L
IMMATURE GRANULOCYTES %: 0.5 % (ref 0–5)
LYMPHOCYTES ABSOLUTE: 1.6 E9/L (ref 1.5–4)
LYMPHOCYTES RELATIVE PERCENT: 14 % (ref 20–42)
MCH RBC QN AUTO: 31.4 PG (ref 26–35)
MCHC RBC AUTO-ENTMCNC: 34.2 % (ref 32–34.5)
MCV RBC AUTO: 91.7 FL (ref 80–99.9)
MONOCYTES ABSOLUTE: 0.79 E9/L (ref 0.1–0.95)
MONOCYTES RELATIVE PERCENT: 6.9 % (ref 2–12)
NEUTROPHILS ABSOLUTE: 8.73 E9/L (ref 1.8–7.3)
NEUTROPHILS RELATIVE PERCENT: 76.7 % (ref 43–80)
PDW BLD-RTO: 12.8 FL (ref 11.5–15)
PLATELET # BLD: 291 E9/L (ref 130–450)
PMV BLD AUTO: 9.8 FL (ref 7–12)
POTASSIUM REFLEX MAGNESIUM: 4.7 MMOL/L (ref 3.5–5)
RBC # BLD: 5.16 E12/L (ref 3.8–5.8)
SODIUM BLD-SCNC: 138 MMOL/L (ref 132–146)
TROPONIN, HIGH SENSITIVITY: 8 NG/L (ref 0–11)
TSH SERPL DL<=0.05 MIU/L-ACNC: 0.86 UIU/ML (ref 0.27–4.2)
WBC # BLD: 11.4 E9/L (ref 4.5–11.5)

## 2021-08-17 PROCEDURE — 85025 COMPLETE CBC W/AUTO DIFF WBC: CPT

## 2021-08-17 PROCEDURE — 84443 ASSAY THYROID STIM HORMONE: CPT

## 2021-08-17 PROCEDURE — 99284 EMERGENCY DEPT VISIT MOD MDM: CPT

## 2021-08-17 PROCEDURE — 80048 BASIC METABOLIC PNL TOTAL CA: CPT

## 2021-08-17 PROCEDURE — 84484 ASSAY OF TROPONIN QUANT: CPT

## 2021-08-17 PROCEDURE — 71045 X-RAY EXAM CHEST 1 VIEW: CPT

## 2021-08-17 RX ORDER — ASPIRIN 81 MG/1
81 TABLET ORAL DAILY
Qty: 90 TABLET | Refills: 1 | Status: SHIPPED | OUTPATIENT
Start: 2021-08-17 | End: 2021-09-17

## 2021-08-17 ASSESSMENT — ENCOUNTER SYMPTOMS
VOMITING: 0
EYE REDNESS: 0
BACK PAIN: 0
EYE PAIN: 0
SHORTNESS OF BREATH: 0
NAUSEA: 0
COUGH: 0
WHEEZING: 0
DIARRHEA: 0
SORE THROAT: 0
SINUS PAIN: 0
ABDOMINAL PAIN: 0

## 2021-08-17 ASSESSMENT — LIFESTYLE VARIABLES: SMOKING_STATUS: 0

## 2021-08-17 NOTE — ANESTHESIA PRE PROCEDURE
Department of Anesthesiology  Preprocedure Note       Name:  Kamila Rust   Age:  60 y.o.  :  1955                                          MRN:  [id number]         Date:  2021      Surgeon: Noman Jovel):  James Scales MD    Procedure: Procedure(s):  RIGHT PARTIAL AURICULECTOMY WITH FROZEN SECTION   (PATHOLOGY PRESENT)    Medications prior to admission:   Prior to Admission medications    Medication Sig Start Date End Date Taking? Authorizing Provider   lidocaine-prilocaine (EMLA) 2.5-2.5 % cream Not currently using 21  Yes Historical Provider, MD   cephALEXin (KEFLEX) 500 MG capsule Take 2 capsules by mouth every 8 hours for 10 days 21 Yes JCARLOS Marcelo - CNP   latanoprost (XALATAN) 0.005 % ophthalmic solution Place 1 drop into both eyes nightly   Yes Historical Provider, MD   metoprolol succinate (TOPROL XL) 100 MG extended release tablet Take 200 mg by mouth daily In am   Yes Historical Provider, MD   traMADol (ULTRAM) 50 MG tablet Take 50 mg by mouth every 6 hours as needed for Pain. For pain d/t current infection (prescribed by PCP)   Yes Historical Provider, MD       Current medications:    No current facility-administered medications for this encounter. 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       Past Medical History:        Diagnosis Date    Abscess     Right ear    Glaucoma     Hypertension        Past Surgical History:        Procedure Laterality Date    HERNIA REPAIR      WISDOM TOOTH EXTRACTION         Social History:    Social History     Tobacco Use    Smoking status: Never Smoker    Smokeless tobacco: Never Used   Substance Use Topics    Alcohol use:  Yes     Alcohol/week: 1.0 standard drinks     Types: 1 Cans of beer per week     Comment: socially                                Counseling given: Not Answered      Vital Signs (Current):   Vitals:    08/13/21 1147   Weight: 227 lb (103 kg)   Height: 5' 10\" (1.778 m)                                              BP Readings from Last 3 Encounters:   08/11/21 (!) 150/91       NPO Status:                                                                                 BMI:   Wt Readings from Last 3 Encounters:   08/13/21 227 lb (103 kg)   08/09/21 227 lb 5 oz (103.1 kg)     Body mass index is 32.57 kg/m². CBC:   Lab Results   Component Value Date    WBC 8.9 08/11/2021    RBC 5.11 08/11/2021    HGB 16.1 08/11/2021    HCT 47.6 08/11/2021    MCV 93.2 08/11/2021    RDW 12.7 08/11/2021     08/11/2021       CMP:   Lab Results   Component Value Date     08/11/2021    K 4.0 08/11/2021    K 4.3 08/10/2021     08/11/2021    CO2 24 08/11/2021    BUN 9 08/11/2021    CREATININE 1.0 08/11/2021    GFRAA >60 08/11/2021    LABGLOM >60 08/11/2021    GLUCOSE 103 08/11/2021    PROT 6.9 08/11/2021    CALCIUM 8.8 08/11/2021    BILITOT 1.1 08/11/2021    ALKPHOS 94 08/11/2021    AST 35 08/11/2021    ALT 20 08/11/2021       POC Tests: No results for input(s): POCGLU, POCNA, POCK, POCCL, POCBUN, POCHEMO, POCHCT in the last 72 hours.     Coags: No results found for: PROTIME, INR, APTT    HCG (If Applicable): No results found for: PREGTESTUR, PREGSERUM, HCG, HCGQUANT     ABGs: No results found for: PHART, PO2ART, GGW6VZG, FJQ5ICR, BEART, C8DDCUQA     Type & Screen (If Applicable):  No results found for: LABABO, LABRH    Drug/Infectious Status (If Applicable):  No results found for: HIV, HEPCAB    COVID-19 Screening (If Applicable): No results found for: COVID19        Anesthesia Evaluation  Patient summary reviewed  Airway:         Dental:          Pulmonary:       (-) not a current smoker                           Cardiovascular:    (+) hypertension:,                   Neuro/Psych:                ROS comment: Glaucoma GI/Hepatic/Renal:             Endo/Other:                     Abdominal:

## 2021-08-17 NOTE — ED PROVIDER NOTES
42-year-old male with past medical history of high blood pressure presenting today from preop with new onset atrial fibrillation. He was supposed to get a mass removed from his right ear lobe with Dr. Sandhya Callejas today when they discovered the atrial fibrillation. He has never had this before, has not been experiencing any dizziness or lightheadedness, no chest pain, no shortness of breath, has had no recent changes in activity or recent sicknesses. He does not smoke or drink excessively. He is resting comfortably here today and has never seen a cardiologist before. Review of Systems   Constitutional: Negative for chills and fever. HENT: Negative for ear pain, sinus pain and sore throat. Eyes: Negative for pain and redness. Respiratory: Negative for cough, shortness of breath and wheezing. Cardiovascular: Negative for chest pain. Gastrointestinal: Negative for abdominal pain, diarrhea, nausea and vomiting. Genitourinary: Negative for dysuria and flank pain. Musculoskeletal: Negative for back pain and neck pain. Skin: Negative for rash. Neurological: Negative for seizures and headaches. Hematological: Negative for adenopathy. All other systems reviewed and are negative. Physical Exam  Vitals and nursing note reviewed. Constitutional:       Appearance: Normal appearance. He is not ill-appearing. HENT:      Head: Normocephalic and atraumatic. Right Ear: External ear normal.      Left Ear: External ear normal.      Nose: Nose normal.      Mouth/Throat:      Mouth: Mucous membranes are moist.      Pharynx: Oropharynx is clear. Eyes:      Conjunctiva/sclera: Conjunctivae normal.      Pupils: Pupils are equal, round, and reactive to light. Cardiovascular:      Rate and Rhythm: Normal rate. Rhythm irregular. Pulmonary:      Breath sounds: Normal breath sounds. Abdominal:      General: Abdomen is flat. Palpations: Abdomen is soft.    Musculoskeletal: General: No deformity or signs of injury. Cervical back: Neck supple. Skin:     General: Skin is warm and dry. Neurological:      General: No focal deficit present. Mental Status: He is alert. Mental status is at baseline. Procedures   EKG: This EKG is signed and interpreted by me. Rate: 92  Rhythm: Atrial fibrillation  Interpretation: no acute changes  Comparison: no previous EKG    MDM  Number of Diagnoses or Management Options  Irregular cardiac rhythm  Diagnosis management comments: 70-year-old male with past medical history of high blood pressure presenting today for new onset atrial fibrillation found in preop. He is hemodynamically stable upon his arrival to the emergency department, the EKG does show atrial fibrillation however there is some artifact that makes true P waves difficult to discern. He is on metoprolol for blood pressure is keeping his rate controlled. We will complete cardiac labs and potentially talk to cardiology. Patient's [id number] 2 score was 1. TSH was within normal limits and high-sensitivity troponin was negative. White count was within normal limits as well as BMP. We discussed with the patient that his labs were normal and started him on aspirin. Since his [id number] score was so low we did not think it was necessary to start him on a more aggressive blood thinner. He will have a cardiology follow-up. He is disappointed he did not get his surgery today for the mass on his earlobe but he verbalized understanding with the plan. He knows to return to the emergency department if any the symptoms acutely worsen. --------------------------------------------- PAST HISTORY ---------------------------------------------  Past Medical History:  has a past medical history of Abscess, Glaucoma, and Hypertension. Past Surgical History:  has a past surgical history that includes hernia repair (1977) and [place] tooth extraction.     Social History:  reports that he has never smoked. He has never used smokeless tobacco. He reports current alcohol use of about 1.0 standard drinks of alcohol per week. He reports that he does not use drugs. Family History: family history is not on file. The patients home medications have been reviewed. Allergies: Patient has no known allergies.     -------------------------------------------------- RESULTS -------------------------------------------------  Labs:  Results for orders placed or performed during the hospital encounter of 08/17/21   CBC Auto Differential   Result Value Ref Range    WBC 11.4 4.5 - 11.5 E9/L    RBC 5.16 3.80 - 5.80 E12/L    Hemoglobin 16.2 12.5 - 16.5 g/dL    Hematocrit 47.3 37.0 - 54.0 %    MCV 91.7 80.0 - 99.9 fL    MCH 31.4 26.0 - 35.0 pg    MCHC 34.2 32.0 - 34.5 %    RDW 12.8 11.5 - 15.0 fL    Platelets 762 229 - 574 E9/L    MPV 9.8 7.0 - 12.0 fL    Neutrophils % 76.7 43.0 - 80.0 %    Immature Granulocytes % 0.5 0.0 - 5.0 %    Lymphocytes % 14.0 (L) 20.0 - 42.0 %    Monocytes % 6.9 2.0 - 12.0 %    Eosinophils % 1.2 0.0 - 6.0 %    Basophils % 0.7 0.0 - 2.0 %    Neutrophils Absolute 8.73 (H) 1.80 - 7.30 E9/L    Immature Granulocytes # 0.06 E9/L    Lymphocytes Absolute 1.60 1.50 - 4.00 E9/L    Monocytes Absolute 0.79 0.10 - 0.95 E9/L    Eosinophils Absolute 0.14 0.05 - 0.50 E9/L    Basophils Absolute 0.08 0.00 - 0.20 I6/K   Basic Metabolic Panel w/ Reflex to MG   Result Value Ref Range    Sodium 138 132 - 146 mmol/L    Potassium reflex Magnesium 4.7 3.5 - 5.0 mmol/L    Chloride 103 98 - 107 mmol/L    CO2 23 22 - 29 mmol/L    Anion Gap 12 7 - 16 mmol/L    Glucose 121 (H) 74 - 99 mg/dL    BUN 12 6 - 23 mg/dL    CREATININE 1.0 0.7 - 1.2 mg/dL    GFR Non-African American >60 >=60 mL/min/1.73    GFR African American >60     Calcium 9.0 8.6 - 10.2 mg/dL   Troponin   Result Value Ref Range    Troponin, High Sensitivity 8 0 - 11 ng/L   TSH WITHOUT REFLEX   Result Value Ref Range    TSH 0.862 0.270 - 4.200 uIU/mL       Radiology:  XR CHEST PORTABLE   Final Result   No significant acute process             ------------------------- NURSING NOTES AND VITALS REVIEWED ---------------------------  Date / Time Roomed:  8/17/2021  9:03 AM  ED Bed Assignment:  20/20    The nursing notes within the ED encounter and vital signs as below have been reviewed. BP (!) 148/115   Pulse 96   Temp 98.5 °F (36.9 °C) (Oral)   Resp 16   SpO2 98%   Oxygen Saturation Interpretation: Normal      ------------------------------------------ PROGRESS NOTES ------------------------------------------  I have spoken with the patient and discussed todays results, in addition to providing specific details for the plan of care and counseling regarding the diagnosis and prognosis. Their questions are answered at this time and they are agreeable with the plan. I discussed at length with them reasons for immediate return here for re evaluation. They will followup with primary care by calling their office tomorrow. --------------------------------- ADDITIONAL PROVIDER NOTES ---------------------------------  At this time the patient is without objective evidence of an acute process requiring hospitalization or inpatient management. They have remained hemodynamically stable throughout their entire ED visit and are stable for discharge with outpatient follow-up. The plan has been discussed in detail and they are aware of the specific conditions for emergent return, as well as the importance of follow-up. Discharge Medication List as of 8/17/2021 11:10 AM      START taking these medications    Details   aspirin EC 81 MG EC tablet Take 1 tablet by mouth daily, Disp-90 tablet, R-1Print             Diagnosis:  1. Irregular cardiac rhythm        Disposition:  Patient's disposition: Discharge to home  Patient's condition is stable.                      Irene Hull DO  Resident  08/17/21 [id number]

## 2021-08-26 ENCOUNTER — OFFICE VISIT (OUTPATIENT)
Dept: CARDIOLOGY CLINIC | Age: 66
End: 2021-08-26
Payer: COMMERCIAL

## 2021-08-26 VITALS
SYSTOLIC BLOOD PRESSURE: 182 MMHG | HEART RATE: 122 BPM | DIASTOLIC BLOOD PRESSURE: 109 MMHG | RESPIRATION RATE: 16 BRPM | HEIGHT: 70 IN | WEIGHT: 224.6 LBS | BODY MASS INDEX: 32.16 KG/M2

## 2021-08-26 DIAGNOSIS — I48.19 PERSISTENT ATRIAL FIBRILLATION (HCC): ICD-10-CM

## 2021-08-26 DIAGNOSIS — I10 ESSENTIAL HYPERTENSION: ICD-10-CM

## 2021-08-26 PROBLEM — H93.8X1: Status: RESOLVED | Noted: 2021-08-15 | Resolved: 2021-08-26

## 2021-08-26 PROBLEM — L03.90 CELLULITIS: Status: RESOLVED | Noted: 2021-08-09 | Resolved: 2021-08-26

## 2021-08-26 PROCEDURE — 99245 OFF/OP CONSLTJ NEW/EST HI 55: CPT | Performed by: INTERNAL MEDICINE

## 2021-08-26 PROCEDURE — 93000 ELECTROCARDIOGRAM COMPLETE: CPT | Performed by: INTERNAL MEDICINE

## 2021-08-26 RX ORDER — OXYCODONE HYDROCHLORIDE 20 MG/1
TABLET ORAL
COMMUNITY
Start: 2021-08-23

## 2021-08-26 RX ORDER — CEPHALEXIN 500 MG/1
500 CAPSULE ORAL 4 TIMES DAILY
COMMUNITY
End: 2021-09-17

## 2021-08-26 RX ORDER — ZOLPIDEM TARTRATE 10 MG/1
TABLET ORAL
COMMUNITY
Start: 2021-08-19

## 2021-08-26 NOTE — PROGRESS NOTES
Chief Complaint   Patient presents with    Atrial Fibrillation       Patient Active Problem List    Diagnosis Date Noted    Persistent atrial fibrillation ([facility] [address] 08/26/2021     Overview Note:     GEETHA ALEXANDERDSVASC = 2        HTN (hypertension) 08/26/2021       Current Outpatient Medications   Medication Sig Dispense Refill    oxyCODONE (ROXICODONE) 20 MG immediate release tablet TAKE ONE TABLET BY MOUTH EVERY 6 HOURS AS NEEDED      zolpidem (AMBIEN) 10 MG tablet TAKE 1 TABLET BY MOUTH AT BEDTIME AS NEEDED      cephALEXin (KEFLEX) 500 MG capsule Take 500 mg by mouth 4 times daily      apixaban (ELIQUIS) 5 MG TABS tablet Take by mouth 2 times daily      aspirin EC 81 MG EC tablet Take 1 tablet by mouth daily 90 tablet 1    latanoprost (XALATAN) 0.005 % ophthalmic solution Place 1 drop into both eyes nightly      metoprolol succinate (TOPROL XL) 100 MG extended release tablet Take 200 mg by mouth daily In am      traMADol (ULTRAM) 50 MG tablet Take 50 mg by mouth every 6 hours as needed for Pain. For pain d/t current infection (prescribed by PCP)       No current facility-administered medications for this visit. No Known Allergies    Vitals:    08/26/21 0857   BP: (!) 182/109   Pulse: 122   Resp: 16   Weight: 224 lb 9.6 oz (101.9 kg)   Height: 5' 10\" (1.778 m)       Social History     Socioeconomic History    Marital status:      Spouse name: Not on file    Number of children: Not on file    Years of education: Not on file    Highest education level: Not on file   Occupational History    Not on file   Tobacco Use    Smoking status: Never Smoker    Smokeless tobacco: Never Used   Vaping Use    Vaping Use: Never used   Substance and Sexual Activity    Alcohol use:  Yes     Alcohol/week: 1.0 standard drinks     Types: 1 Cans of beer per week     Comment: socially    Drug use: Never    Sexual activity: Yes     Partners: Female   Other Topics Concern    Not on file   Social History Narrative  Not on file     Social Determinants of Health     Financial Resource Strain:     Difficulty of Paying Living Expenses:    Food Insecurity:     Worried About Running Out of Food in the Last Year:     [address] in the Last Year:    Transportation Needs:     Lack of Transportation (Medical):  Lack of Transportation (Non-Medical):    Physical Activity:     Days of Exercise per Week:     Minutes of Exercise per Session:    Stress:     Feeling of Stress :    Social Connections:     Frequency of Communication with Friends and Family:     Frequency of Social Gatherings with Friends and Family:     Attends [employer] Services:     Active Member of Clubs or Organizations:     Attends Club or Organization Meetings:     Marital Status:    Intimate Partner Violence:     Fear of Current or Ex-Partner:     Emotionally Abused:     Physically Abused:     Sexually Abused:        History reviewed. No pertinent family history. SUBJECTIVE: Destin Parker presents to the office today for consult - dr Fran Lerma - for cardiac evaluation. Was to have an ENT procedure on his ear, they noted AF, cancelled surgery and sent him to ED even tho he was completely asymptomatic. PCP placed him on NOAC and ASA   He complains of no cardiac hx, nor hx of AF and denies   chest pain, chest pressure/discomfort, claudication, dyspnea, exertional chest pressure/discomfort, fatigue, irregular heart beat, lower extremity edema, near-syncope, orthopnea, palpitations, paroxysmal nocturnal dyspnea, syncope and tachypnea. Review of Systems:   Heart: as above   Lungs: as above   Eyes: denies changes in vision or discharge. Ears: denies changes in hearing or pain. Nose: denies epistaxis or masses   Throat: denies sore throat or trouble swallowing. Neuro: denies numbness, tingling, tremors. Skin: denies rashes or itching.    : denies hematuria, dysuria   GI: denies vomiting, diarrhea   Psych: denies mood changed, anxiety, depression. all others negative. Physical Exam   BP (!) 182/109   Pulse 122   Resp 16   Ht 5' 10\" (1.778 m)   Wt 224 lb 9.6 oz (101.9 kg)   BMI 32.23 kg/m²   Constitutional: Oriented to person, place, and time. Well-developed and well-nourished. No distress. Head: Normocephalic and atraumatic. Mass right earlobe  Eyes: EOM are normal. Pupils are equal, round, and reactive to light. Neck: Normal range of motion. Neck supple. No hepatojugular reflux and no JVD present. Carotid bruit is not present. Cardiovascular: rapid rate, irregular rhythm, normal heart sounds and intact distal pulses. Exam reveals no gallop and no friction rub. No murmur heard. Pulmonary/Chest: Effort normal and breath sounds normal. No respiratory distress. No wheezes. No rales. Abdominal: Soft. Bowel sounds are normal. No distension and no mass. No tenderness. No rebound and no guarding. Musculoskeletal: Normal range of motion. No edema and no tenderness. Neurological: Alert and oriented to person, place, and time. Skin: Skin is warm and dry. No rash noted. Not diaphoretic. No erythema. Psychiatric: Normal mood and affect.  Behavior is normal.     EKG:  atrial fibrillation, rate rapid, otherwise normal     ASSESSMENT AND PLAN:  Patient Active Problem List   Diagnosis    Persistent atrial fibrillation (HCC)    HTN (hypertension)     Patient with newly detected AF, asymptomatic, likely non valvular  In need of ENT surgery   Stop ASA, continue NOAC  EMILY guided DCCV discussed in detail with patient and wife  Can then have surgery 4-5 weeks after procedure off Sal[facility] Concepcion M.D  [facility]

## 2021-09-15 ENCOUNTER — TELEPHONE (OUTPATIENT)
Dept: NON INVASIVE DIAGNOSTICS | Age: 66
End: 2021-09-15

## 2021-09-16 ENCOUNTER — ANESTHESIA EVENT (OUTPATIENT)
Dept: CARDIAC CATH/INVASIVE PROCEDURES | Age: 66
End: 2021-09-16

## 2021-09-17 ENCOUNTER — ANESTHESIA (OUTPATIENT)
Dept: CARDIAC CATH/INVASIVE PROCEDURES | Age: 66
End: 2021-09-17

## 2021-09-17 ENCOUNTER — TELEPHONE (OUTPATIENT)
Dept: CARDIOLOGY CLINIC | Age: 66
End: 2021-09-17

## 2021-09-17 ENCOUNTER — HOSPITAL ENCOUNTER (OUTPATIENT)
Dept: CARDIAC CATH/INVASIVE PROCEDURES | Age: 66
Discharge: HOME | End: 2021-09-17
Payer: COMMERCIAL

## 2021-09-17 VITALS
DIASTOLIC BLOOD PRESSURE: 79 MMHG | OXYGEN SATURATION: 99 % | RESPIRATION RATE: 24 BRPM | SYSTOLIC BLOOD PRESSURE: 111 MMHG

## 2021-09-17 VITALS
TEMPERATURE: 97.7 F | OXYGEN SATURATION: 98 % | HEART RATE: 88 BPM | RESPIRATION RATE: 12 BRPM | DIASTOLIC BLOOD PRESSURE: 79 MMHG | SYSTOLIC BLOOD PRESSURE: 111 MMHG

## 2021-09-17 LAB
LV EF: 58 %
LVEF MODALITY: NORMAL

## 2021-09-17 PROCEDURE — 2709999900 HC NON-CHARGEABLE SUPPLY

## 2021-09-17 PROCEDURE — 3700000000 HC ANESTHESIA ATTENDED CARE

## 2021-09-17 PROCEDURE — 93325 DOPPLER ECHO COLOR FLOW MAPG: CPT

## 2021-09-17 PROCEDURE — 2580000003 HC RX 258: Performed by: NURSE ANESTHETIST, CERTIFIED REGISTERED

## 2021-09-17 PROCEDURE — 93312 ECHO TRANSESOPHAGEAL: CPT

## 2021-09-17 PROCEDURE — 3700000001 HC ADD 15 MINUTES (ANESTHESIA)

## 2021-09-17 PROCEDURE — 6360000002 HC RX W HCPCS

## 2021-09-17 PROCEDURE — 92960 CARDIOVERSION ELECTRIC EXT: CPT

## 2021-09-17 PROCEDURE — 92960 CARDIOVERSION ELECTRIC EXT: CPT | Performed by: INTERNAL MEDICINE

## 2021-09-17 PROCEDURE — 93321 DOPPLER ECHO F-UP/LMTD STD: CPT

## 2021-09-17 PROCEDURE — 2580000003 HC RX 258: Performed by: INTERNAL MEDICINE

## 2021-09-17 RX ORDER — PROPOFOL 10 MG/ML
INJECTION, EMULSION INTRAVENOUS PRN
Status: DISCONTINUED | OUTPATIENT
Start: 2021-09-17 | End: 2021-09-17 | Stop reason: SDUPTHER

## 2021-09-17 RX ORDER — SODIUM CHLORIDE 9 MG/ML
INJECTION, SOLUTION INTRAVENOUS CONTINUOUS
Status: DISCONTINUED | OUTPATIENT
Start: 2021-09-17 | End: 2021-09-18 | Stop reason: HOSPADM

## 2021-09-17 RX ORDER — SODIUM CHLORIDE 9 MG/ML
INJECTION, SOLUTION INTRAVENOUS CONTINUOUS PRN
Status: DISCONTINUED | OUTPATIENT
Start: 2021-09-17 | End: 2021-09-17 | Stop reason: SDUPTHER

## 2021-09-17 RX ADMIN — PROPOFOL 230 MG: 10 INJECTION, EMULSION INTRAVENOUS at 08:34

## 2021-09-17 RX ADMIN — SODIUM CHLORIDE: 9 INJECTION, SOLUTION INTRAVENOUS at 07:40

## 2021-09-17 RX ADMIN — SODIUM CHLORIDE: 9 INJECTION, SOLUTION INTRAVENOUS at 07:44

## 2021-09-17 NOTE — TELEPHONE ENCOUNTER
Risk is low but there is no alternative unless they would do surgery ON the blood thinner - doubtful   Would have to stop eliquis 48 hours before and restarted asap after procedure

## 2021-09-17 NOTE — ANESTHESIA PRE PROCEDURE
Department of Anesthesiology  Preprocedure Note       Name:  Randy Mccormick   Age:  60 y.o.  :  1955                                          MRN:  [id number]         Date:  2021      Surgeon: Dr. Walter Mehta MD  Procedure: EMILY with cardioversion    Medications prior to admission:   Prior to Admission medications    Medication Sig Start Date End Date Taking?  Authorizing Provider   oxyCODONE (ROXICODONE) 20 MG immediate release tablet TAKE ONE TABLET BY MOUTH EVERY 6 HOURS AS NEEDED 21  Yes Historical Provider, MD   zolpidem (AMBIEN) 10 MG tablet TAKE 1 TABLET BY MOUTH AT BEDTIME AS NEEDED 21  Yes Historical Provider, MD   apixaban (ELIQUIS) 5 MG TABS tablet Take by mouth 2 times daily   Yes Historical Provider, MD   latanoprost (XALATAN) 0.005 % ophthalmic solution Place 1 drop into both eyes nightly   Yes Historical Provider, MD   metoprolol succinate (TOPROL XL) 100 MG extended release tablet Take 200 mg by mouth daily In am   Yes Historical Provider, MD       Current medications:    Current Outpatient Medications   Medication Sig Dispense Refill    oxyCODONE (ROXICODONE) 20 MG immediate release tablet TAKE ONE TABLET BY MOUTH EVERY 6 HOURS AS NEEDED      zolpidem (AMBIEN) 10 MG tablet TAKE 1 TABLET BY MOUTH AT BEDTIME AS NEEDED      apixaban (ELIQUIS) 5 MG TABS tablet Take by mouth 2 times daily      latanoprost (XALATAN) 0.005 % ophthalmic solution Place 1 drop into both eyes nightly      metoprolol succinate (TOPROL XL) 100 MG extended release tablet Take 200 mg by mouth daily In am       Current Facility-Administered Medications   Medication Dose Route Frequency Provider Last Rate Last Admin    0.9 % sodium chloride infusion   IntraVENous Continuous Walter Mehta MD           Allergies:  No Known Allergies    Problem List:    Patient Active Problem List   Diagnosis Code    Persistent atrial fibrillation (HCC) I48.19    HTN (hypertension) I10       Past Medical History: Diagnosis Date    Abscess     Right ear    Glaucoma     Hypertension        Past Surgical History:        Procedure Laterality Date    HERNIA REPAIR  1977    WISDOM TOOTH EXTRACTION         Social History:    Social History     Tobacco Use    Smoking status: Never Smoker    Smokeless tobacco: Never Used   Substance Use Topics    Alcohol use: Yes     Alcohol/week: 1.0 standard drinks     Types: 1 Cans of beer per week     Comment: socially                                Counseling given: Not Answered      Vital Signs (Current): There were no vitals filed for this visit. BP Readings from Last 3 Encounters:   08/26/21 (!) 182/109   08/17/21 (!) 148/115   08/17/21 (!) 161/95       NPO Status:  09/16/2021 1930                                                                               BMI:   Wt Readings from Last 3 Encounters:   08/26/21 224 lb 9.6 oz (101.9 kg)   08/17/21 227 lb (103 kg)   08/09/21 227 lb 5 oz (103.1 kg)     There is no height or weight on file to calculate BMI.    CBC:   Lab Results   Component Value Date    WBC 11.4 08/17/2021    RBC 5.16 08/17/2021    HGB 16.2 08/17/2021    HCT 47.3 08/17/2021    MCV 91.7 08/17/2021    RDW 12.8 08/17/2021     08/17/2021       CMP:   Lab Results   Component Value Date     08/17/2021    K 4.7 08/17/2021     08/17/2021    CO2 23 08/17/2021    BUN 12 08/17/2021    CREATININE 1.0 08/17/2021    GFRAA >60 08/17/2021    LABGLOM >60 08/17/2021    GLUCOSE 121 08/17/2021    PROT 6.9 08/11/2021    CALCIUM 9.0 08/17/2021    BILITOT 1.1 08/11/2021    ALKPHOS 94 08/11/2021    AST 35 08/11/2021    ALT 20 08/11/2021       POC Tests: No results for input(s): POCGLU, POCNA, POCK, POCCL, POCBUN, POCHEMO, POCHCT in the last 72 hours.     Coags: No results found for: PROTIME, INR, APTT    HCG (If Applicable): No results found for: PREGTESTUR, PREGSERUM, HCG, HCGQUANT     ABGs: No results found for: PHART, PO2ART, TKW8KLC, VGL8RAC, BEART, X8BUEVIC     Type & Screen (If Applicable):  No results found for: LABABO, LABRH    Drug/Infectious Status (If Applicable):  No results found for: HIV, HEPCAB    COVID-19 Screening (If Applicable): No results found for: COVID19    EK2021 - Atrial Fibrillation     CXR: 2021   FINDINGS:   Heart size is borderline prominent.  There are no infiltrates or effusions. Anesthesia Evaluation  Patient summary reviewed and Nursing notes reviewed no history of anesthetic complications:   Airway: Mallampati: III  TM distance: >3 FB   Neck ROM: full  Mouth opening: < 3 FB Dental:      Comment: Per patient, poor dentition. No loose teeth, some are jagged and chipped. Pulmonary:Negative Pulmonary ROS breath sounds clear to auscultation                             Cardiovascular:  Exercise tolerance: good (>4 METS),   (+) hypertension: moderate,       ECG reviewed  Rhythm: irregular  Rate: normal           Beta Blocker:  Dose within 24 Hrs         Neuro/Psych:   Negative Neuro/Psych ROS              GI/Hepatic/Renal: Neg GI/Hepatic/Renal ROS            Endo/Other: Negative Endo/Other ROS                    Abdominal:             Vascular: negative vascular ROS. Other Findings:             Anesthesia Plan      MAC     ASA 3     (20 A/C IV)  Induction: intravenous. Anesthetic plan and risks discussed with patient. Use of blood products discussed with patient whom consented to blood products. Plan discussed with CRNA and attending.                   Thang Fuller RN   2021

## 2021-09-17 NOTE — TELEPHONE ENCOUNTER
Patient had EMILY/DCCV today. The DCCV did not work and he is still in atrial fibrillation. He is concerned as he needs to get this ear surgery done and he will need to be off his blood thinner.   Please advise

## 2021-09-17 NOTE — ANESTHESIA POSTPROCEDURE EVALUATION
Department of Anesthesiology  Postprocedure Note    Patient: Deep Matthew  MRN: [id number]  YOB: 1955  Date of evaluation: 9/17/2021  Time:  2:44 PM     Procedure Summary     Date: 09/17/21 Room / Location: [facility] CATH LAB; [facility] ECHO    Anesthesia Start: 2783 Anesthesia Stop: 0848    Procedure: SEY EMILY CARDIOVERSION W/ ANES Diagnosis:     Scheduled Providers: JCARLOS Ag - CRNA; Braulio Marquez MD Responsible Provider: Braulio Marquez MD    Anesthesia Type: MAC ASA Status: 3          Anesthesia Type: MAC    Swetha Phase I:      Swetha Phase II:      Last vitals: Reviewed and per EMR flowsheets.        Anesthesia Post Evaluation    Patient location during evaluation: PACU  Patient participation: complete - patient participated  Level of consciousness: awake and alert  Airway patency: patent  Nausea & Vomiting: no nausea and no vomiting  Complications: no  Cardiovascular status: hemodynamically stable and blood pressure returned to baseline  Respiratory status: acceptable  Hydration status: euvolemic

## 2021-09-17 NOTE — OP NOTE
Cardioversion: Consent for operation or procedure: Risks and benefits discussed with patient and consent obtained    Diagnosis: Atrial fibrillation. EBL: 0 ml    The appropriate time-out procedure was performed including proper identification of the patient, physician, procedure, documentation, and there were no safety issues identified. The patient participated actively in this. After sedation was achieved, the patient  was placed in the supine position and hands free patches were placed on his chest in the AP position. 2 shocks provided at 200 Joules. Unsuccessful attempts at restoration of sinus rhythm. Consider repeat attempt with loading of an anti-arrythmic agent. Complications: The patient tolerated the procedure well without complications.     Yuliya Valderrama MD  Cardiologist  Cardiology, [facility]) Physicians

## 2021-09-27 NOTE — PROGRESS NOTES

## 2021-09-27 NOTE — PROGRESS NOTES
Angi PRE-ADMISSION TESTING INSTRUCTIONS    The Preadmission Testing patient is instructed accordingly using the following criteria (check applicable):    ARRIVAL INSTRUCTIONS:  [x] Parking the day of Surgery is located in the Main Entrance lot. Upon entering the door, make an immediate right to the surgery reception desk    [x] Bring photo ID and insurance card    [] Bring in a copy of Living will or Durable Power of  papers. [x] Please be sure to arrange for responsible adult to provide transportation to and from the hospital    [x] Please arrange for responsible adult to be with you for the 24 hour period post procedure due to having anesthesia      GENERAL INSTRUCTIONS:    [x] Nothing by mouth after midnight, including gum, candy, mints or water    [x] You may brush your teeth, but do not swallow any water    [x] Take medications as instructed with 1-2 oz of water    [] Stop herbal supplements and vitamins 5 days prior to procedure    [x] Follow preop dosing of blood thinners per physician instructions    [] Take 1/2 dose of evening insulin, but no insulin after midnight    [] No oral diabetic medications after midnight    [] If diabetic and have low blood sugar or feel symptomatic, take 1-2oz apple juice only    [] Bring inhalers day of surgery    [] Bring C-PAP/ Bi-Pap day of surgery    [] Bring urine specimen day of surgery    [x] Shower or bath with soap, lather and rinse well, AM of Surgery, no lotion, powders or creams to surgical site    [] Follow bowel prep as instructed per surgeon    [x] No tobacco products within 24 hours of surgery     [x] No alcohol or illegal drug use within 24 hours of surgery.     [x] Jewelry, body piercing's, eyeglasses, contact lenses and dentures are not permitted into surgery (bring cases)      [] Please do not wear any nail polish, make up or hair products on the day of surgery    [x] You can expect a call the business day prior to procedure to notify you if your arrival time changes    [x] If you receive a survey after surgery we would greatly appreciate your comments    [] Parent/guardian of a minor must accompany their child and remain on the premises  the entire time they are under our care     [] Pediatric patients may bring favorite toy, blanket or comfort item with them    [] A caregiver or family member must remain with the patient during their stay if they are mentally handicapped, have dementia, disoriented or unable to use a call light or would be a safety concern if left unattended    [x] Please notify surgeon if you develop any illness between now and time of surgery (cold, cough, sore throat, fever, nausea, vomiting) or any signs of infections  including skin, wounds, and dental.    [x]  The Outpatient Pharmacy is available to fill your prescription here on your day of surgery, ask your preop nurse for details    [] Other instructions    EDUCATIONAL MATERIALS PROVIDED:    [] PAT Preoperative Education Packet/Booklet     [] Medication List    [] Transfusion bracelet applied with instructions    [] Shower with soap, lather and rinse well, and use CHG wipes provided the evening before surgery as instructed    [] Incentive spirometer with instructions

## 2021-09-28 ENCOUNTER — ANESTHESIA EVENT (OUTPATIENT)
Dept: OPERATING ROOM | Age: 66
End: 2021-09-28
Payer: COMMERCIAL

## 2021-09-28 ASSESSMENT — LIFESTYLE VARIABLES: SMOKING_STATUS: 0

## 2021-09-28 NOTE — H&P
The H&P has been reviewed, the patient examined, and I concur with the findings of the H & P dated  9/28/2021. The risks, benefits, expected outcomes and alternatives to the recommended procedure have been discussed with pt and family and pt/family wish to proceed. A written consent sheet delineating INDICATIONS, ALTERNATIVES, ANESTHESIA COMPLICATIONS,SURGICAL COMPLICATIONS, AND GENERAL CONSIDERATIONS is present within our office chart and signed by the pt or their representative.

## 2021-09-28 NOTE — H&P
[address] [address]                       PREOPERATIVE HISTORY AND PHYSICAL    PATIENT NAME: Cristiano Renteria                   :        1955  MED REC NO:   [id number]                            ROOM:       21  ACCOUNT NO:   [de-identified]                           ADMIT DATE: 2021  PROVIDER:     Chelly Harrison MD    HISTORY OF PRESENT ILLNESS:  This is a 81-year-old gentleman who  initially presented to the office back in the early part of 2021 with  a one-month history of a sore on his right ear. Subsequently, it has  gotten bigger. He notes that it started on the inferior aspect of what  he points to is the conchal bowl. On several antibiotics without  relief. He was admitted to the hospital for IV antibiotics. There was  no change. He had an incision and drainage. No fluid. Cultures were  negative. He was scheduled for a partial auriculectomy in light of the  fact that it was involving the lower aspect of the auricle back on  , but the case was canceled secondary to new-onset atrial fib. Subsequently, cardiac workup, medications, treatment, and the patient  was seen back in the office several weeks later with the process  extending to the posterior aspect of the auricle. Further cardiac  evaluation was then carried out, and subsequently, the patient presented  to the office on  with crusting of the auricle and involvement of  the lower aspect of the auricle cartilaginous and conchal bowl. The  external auditory canal itself appeared to be spared, but he presents  now for right partial auriculectomy with frozen section control. PAST MEDICAL HISTORY:  He has a history of hypertension. MEDICATIONS:  Include metoprolol, tramadol, Eliquis, and OxyContin. SOCIAL HISTORY:  He does not smoke tobacco.    ALLERGIES:  No known drug allergies.     PHYSICAL EXAMINATION:  HEENT:  Tympanic membrane was normal.  External auditory canal was  intact. Tragus was intact as well. Crusting with necrotic debris on  the inferior two-thirds of the auricle. Superior rim was spared. NECK:  No adenopathy. CHEST:  Clear. CARDIAC:  No murmurs. ABDOMEN:  No masses. IMPRESSION:  Right auricular mass, cellulitis, chondritis. RECOMMENDATIONS:  Right partial auriculectomy with frozen section  control. I discussed with the patient the attempt would be to try to  preserve the superior rim of the auricle, though cellulitis/chondritis  could be involving this aspect as well. Clinically, it does not appear  to be at this time, though. Risks include bleeding, infection, and  cosmetic deformity. The patient understands the need for procedure and  wishes to proceed.       Jong May MD    D: 09/28/2021 6:48:04       T: 09/28/2021 10:21:30     [email or web address]  Job#: [id number]     Doc#: [id number]

## 2021-09-29 ENCOUNTER — HOSPITAL ENCOUNTER (OUTPATIENT)
Age: 66
Setting detail: OUTPATIENT SURGERY
Discharge: HOME | End: 2021-09-29
Attending: OTOLARYNGOLOGY | Admitting: OTOLARYNGOLOGY
Payer: COMMERCIAL

## 2021-09-29 ENCOUNTER — ANESTHESIA (OUTPATIENT)
Dept: OPERATING ROOM | Age: 66
End: 2021-09-29
Payer: COMMERCIAL

## 2021-09-29 VITALS
OXYGEN SATURATION: 95 % | HEIGHT: 70 IN | WEIGHT: 225 LBS | RESPIRATION RATE: 18 BRPM | TEMPERATURE: 97.5 F | HEART RATE: 91 BPM | BODY MASS INDEX: 32.21 KG/M2 | DIASTOLIC BLOOD PRESSURE: 91 MMHG | SYSTOLIC BLOOD PRESSURE: 137 MMHG

## 2021-09-29 VITALS
DIASTOLIC BLOOD PRESSURE: 84 MMHG | SYSTOLIC BLOOD PRESSURE: 140 MMHG | OXYGEN SATURATION: 98 % | TEMPERATURE: 65.8 F | RESPIRATION RATE: 16 BRPM

## 2021-09-29 LAB
ANION GAP SERPL CALCULATED.3IONS-SCNC: 12 MMOL/L (ref 7–16)
BUN BLDV-MCNC: 8 MG/DL (ref 6–23)
CALCIUM SERPL-MCNC: 9.2 MG/DL (ref 8.6–10.2)
CHLORIDE BLD-SCNC: 105 MMOL/L (ref 98–107)
CO2: 24 MMOL/L (ref 22–29)
CREAT SERPL-MCNC: 1.2 MG/DL (ref 0.7–1.2)
GFR AFRICAN AMERICAN: >60
GFR NON-AFRICAN AMERICAN: >60 ML/MIN/1.73
GLUCOSE BLD-MCNC: 127 MG/DL (ref 74–99)
HCT VFR BLD CALC: 49 % (ref 37–54)
HEMOGLOBIN: 16 G/DL (ref 12.5–16.5)
MCH RBC QN AUTO: 32.3 PG (ref 26–35)
MCHC RBC AUTO-ENTMCNC: 32.7 % (ref 32–34.5)
MCV RBC AUTO: 98.8 FL (ref 80–99.9)
PDW BLD-RTO: 13.8 FL (ref 11.5–15)
PLATELET # BLD: 247 E9/L (ref 130–450)
PMV BLD AUTO: 10.2 FL (ref 7–12)
POTASSIUM SERPL-SCNC: 4.4 MMOL/L (ref 3.5–5)
RBC # BLD: 4.96 E12/L (ref 3.8–5.8)
SODIUM BLD-SCNC: 141 MMOL/L (ref 132–146)
WBC # BLD: 8.7 E9/L (ref 4.5–11.5)

## 2021-09-29 PROCEDURE — 7100000000 HC PACU RECOVERY - FIRST 15 MIN: Performed by: OTOLARYNGOLOGY

## 2021-09-29 PROCEDURE — 7100000011 HC PHASE II RECOVERY - ADDTL 15 MIN: Performed by: OTOLARYNGOLOGY

## 2021-09-29 PROCEDURE — 3700000000 HC ANESTHESIA ATTENDED CARE: Performed by: OTOLARYNGOLOGY

## 2021-09-29 PROCEDURE — 87077 CULTURE AEROBIC IDENTIFY: CPT

## 2021-09-29 PROCEDURE — 87070 CULTURE OTHR SPECIMN AEROBIC: CPT

## 2021-09-29 PROCEDURE — 3600000002 HC SURGERY LEVEL 2 BASE: Performed by: OTOLARYNGOLOGY

## 2021-09-29 PROCEDURE — 88305 TISSUE EXAM BY PATHOLOGIST: CPT

## 2021-09-29 PROCEDURE — 6360000002 HC RX W HCPCS: Performed by: NURSE ANESTHETIST, CERTIFIED REGISTERED

## 2021-09-29 PROCEDURE — 87075 CULTR BACTERIA EXCEPT BLOOD: CPT

## 2021-09-29 PROCEDURE — 6370000000 HC RX 637 (ALT 250 FOR IP)

## 2021-09-29 PROCEDURE — 6360000002 HC RX W HCPCS

## 2021-09-29 PROCEDURE — 7100000001 HC PACU RECOVERY - ADDTL 15 MIN: Performed by: OTOLARYNGOLOGY

## 2021-09-29 PROCEDURE — 6370000000 HC RX 637 (ALT 250 FOR IP): Performed by: OTOLARYNGOLOGY

## 2021-09-29 PROCEDURE — 36415 COLL VENOUS BLD VENIPUNCTURE: CPT

## 2021-09-29 PROCEDURE — 2500000003 HC RX 250 WO HCPCS: Performed by: OTOLARYNGOLOGY

## 2021-09-29 PROCEDURE — 7100000010 HC PHASE II RECOVERY - FIRST 15 MIN: Performed by: OTOLARYNGOLOGY

## 2021-09-29 PROCEDURE — 85027 COMPLETE CBC AUTOMATED: CPT

## 2021-09-29 PROCEDURE — 3700000001 HC ADD 15 MINUTES (ANESTHESIA): Performed by: OTOLARYNGOLOGY

## 2021-09-29 PROCEDURE — 87205 SMEAR GRAM STAIN: CPT

## 2021-09-29 PROCEDURE — 2500000003 HC RX 250 WO HCPCS: Performed by: NURSE ANESTHETIST, CERTIFIED REGISTERED

## 2021-09-29 PROCEDURE — 80048 BASIC METABOLIC PNL TOTAL CA: CPT

## 2021-09-29 PROCEDURE — 2709999900 HC NON-CHARGEABLE SUPPLY: Performed by: OTOLARYNGOLOGY

## 2021-09-29 PROCEDURE — 2580000003 HC RX 258: Performed by: OTOLARYNGOLOGY

## 2021-09-29 PROCEDURE — 3600000012 HC SURGERY LEVEL 2 ADDTL 15MIN: Performed by: OTOLARYNGOLOGY

## 2021-09-29 PROCEDURE — 6360000002 HC RX W HCPCS: Performed by: ANESTHESIOLOGY

## 2021-09-29 RX ORDER — DEXAMETHASONE SODIUM PHOSPHATE 4 MG/ML
INJECTION, SOLUTION INTRA-ARTICULAR; INTRALESIONAL; INTRAMUSCULAR; INTRAVENOUS; SOFT TISSUE PRN
Status: DISCONTINUED | OUTPATIENT
Start: 2021-09-29 | End: 2021-09-29 | Stop reason: SDUPTHER

## 2021-09-29 RX ORDER — DIPHENHYDRAMINE HYDROCHLORIDE 50 MG/ML
12.5 INJECTION INTRAMUSCULAR; INTRAVENOUS
Status: DISCONTINUED | OUTPATIENT
Start: 2021-09-29 | End: 2021-09-29 | Stop reason: HOSPADM

## 2021-09-29 RX ORDER — FENTANYL CITRATE 50 UG/ML
25 INJECTION, SOLUTION INTRAMUSCULAR; INTRAVENOUS EVERY 5 MIN PRN
Status: DISCONTINUED | OUTPATIENT
Start: 2021-09-29 | End: 2021-09-29 | Stop reason: HOSPADM

## 2021-09-29 RX ORDER — SODIUM CHLORIDE 0.9 % (FLUSH) 0.9 %
5-40 SYRINGE (ML) INJECTION PRN
Status: DISCONTINUED | OUTPATIENT
Start: 2021-09-29 | End: 2021-09-29 | Stop reason: HOSPADM

## 2021-09-29 RX ORDER — CEFDINIR 300 MG/1
300 CAPSULE ORAL 2 TIMES DAILY
Status: ON HOLD | COMMUNITY
End: 2021-09-29 | Stop reason: HOSPADM

## 2021-09-29 RX ORDER — SODIUM CHLORIDE, SODIUM LACTATE, POTASSIUM CHLORIDE, CALCIUM CHLORIDE 600; 310; 30; 20 MG/100ML; MG/100ML; MG/100ML; MG/100ML
INJECTION, SOLUTION INTRAVENOUS CONTINUOUS
Status: DISCONTINUED | OUTPATIENT
Start: 2021-09-29 | End: 2021-09-29 | Stop reason: HOSPADM

## 2021-09-29 RX ORDER — GLYCOPYRROLATE 1 MG/5 ML
SYRINGE (ML) INTRAVENOUS PRN
Status: DISCONTINUED | OUTPATIENT
Start: 2021-09-29 | End: 2021-09-29 | Stop reason: SDUPTHER

## 2021-09-29 RX ORDER — LIDOCAINE HYDROCHLORIDE AND EPINEPHRINE 10; 10 MG/ML; UG/ML
INJECTION, SOLUTION INFILTRATION; PERINEURAL PRN
Status: DISCONTINUED | OUTPATIENT
Start: 2021-09-29 | End: 2021-09-29 | Stop reason: ALTCHOICE

## 2021-09-29 RX ORDER — SODIUM CHLORIDE 9 MG/ML
25 INJECTION, SOLUTION INTRAVENOUS PRN
Status: DISCONTINUED | OUTPATIENT
Start: 2021-09-29 | End: 2021-09-29 | Stop reason: HOSPADM

## 2021-09-29 RX ORDER — LIDOCAINE HYDROCHLORIDE 20 MG/ML
INJECTION, SOLUTION EPIDURAL; INFILTRATION; INTRACAUDAL; PERINEURAL PRN
Status: DISCONTINUED | OUTPATIENT
Start: 2021-09-29 | End: 2021-09-29 | Stop reason: SDUPTHER

## 2021-09-29 RX ORDER — FENTANYL CITRATE 50 UG/ML
50 INJECTION, SOLUTION INTRAMUSCULAR; INTRAVENOUS EVERY 5 MIN PRN
Status: DISCONTINUED | OUTPATIENT
Start: 2021-09-29 | End: 2021-09-29 | Stop reason: HOSPADM

## 2021-09-29 RX ORDER — ONDANSETRON 2 MG/ML
INJECTION INTRAMUSCULAR; INTRAVENOUS PRN
Status: DISCONTINUED | OUTPATIENT
Start: 2021-09-29 | End: 2021-09-29 | Stop reason: SDUPTHER

## 2021-09-29 RX ORDER — CLINDAMYCIN PHOSPHATE 600 MG/50ML
600 INJECTION INTRAVENOUS
Status: DISCONTINUED | OUTPATIENT
Start: 2021-09-29 | End: 2021-09-29 | Stop reason: HOSPADM

## 2021-09-29 RX ORDER — PROMETHAZINE HYDROCHLORIDE 25 MG/ML
6.25 INJECTION, SOLUTION INTRAMUSCULAR; INTRAVENOUS
Status: DISCONTINUED | OUTPATIENT
Start: 2021-09-29 | End: 2021-09-29 | Stop reason: HOSPADM

## 2021-09-29 RX ORDER — MEPERIDINE HYDROCHLORIDE 25 MG/ML
12.5 INJECTION INTRAMUSCULAR; INTRAVENOUS; SUBCUTANEOUS EVERY 5 MIN PRN
Status: DISCONTINUED | OUTPATIENT
Start: 2021-09-29 | End: 2021-09-29 | Stop reason: HOSPADM

## 2021-09-29 RX ORDER — MIDAZOLAM HYDROCHLORIDE 2 MG/2ML
1 INJECTION, SOLUTION INTRAMUSCULAR; INTRAVENOUS EVERY 10 MIN PRN
Status: DISCONTINUED | OUTPATIENT
Start: 2021-09-29 | End: 2021-09-29 | Stop reason: HOSPADM

## 2021-09-29 RX ORDER — SODIUM CHLORIDE 0.9 % (FLUSH) 0.9 %
5-40 SYRINGE (ML) INJECTION EVERY 12 HOURS SCHEDULED
Status: DISCONTINUED | OUTPATIENT
Start: 2021-09-29 | End: 2021-09-29 | Stop reason: HOSPADM

## 2021-09-29 RX ORDER — OXYCODONE HYDROCHLORIDE AND ACETAMINOPHEN 5; 325 MG/1; MG/1
1 TABLET ORAL
Status: DISCONTINUED | OUTPATIENT
Start: 2021-09-29 | End: 2021-09-29 | Stop reason: HOSPADM

## 2021-09-29 RX ORDER — METOPROLOL TARTRATE 5 MG/5ML
2.5 INJECTION INTRAVENOUS ONCE
Status: DISCONTINUED | OUTPATIENT
Start: 2021-09-29 | End: 2021-09-29

## 2021-09-29 RX ORDER — ACETAMINOPHEN 325 MG/1
650 TABLET ORAL EVERY 4 HOURS PRN
Status: CANCELLED | OUTPATIENT
Start: 2021-09-29

## 2021-09-29 RX ORDER — MIDAZOLAM HYDROCHLORIDE 1 MG/ML
INJECTION INTRAMUSCULAR; INTRAVENOUS
Status: COMPLETED
Start: 2021-09-29 | End: 2021-09-29

## 2021-09-29 RX ORDER — METOPROLOL TARTRATE 5 MG/5ML
INJECTION INTRAVENOUS PRN
Status: DISCONTINUED | OUTPATIENT
Start: 2021-09-29 | End: 2021-09-29 | Stop reason: SDUPTHER

## 2021-09-29 RX ORDER — OXYCODONE HYDROCHLORIDE 5 MG/1
5 TABLET ORAL EVERY 6 HOURS PRN
Status: CANCELLED | OUTPATIENT
Start: 2021-09-29

## 2021-09-29 RX ORDER — SUCCINYLCHOLINE/SOD CL,ISO/PF 200MG/10ML
SYRINGE (ML) INTRAVENOUS PRN
Status: DISCONTINUED | OUTPATIENT
Start: 2021-09-29 | End: 2021-09-29 | Stop reason: SDUPTHER

## 2021-09-29 RX ORDER — FENTANYL CITRATE 50 UG/ML
INJECTION, SOLUTION INTRAMUSCULAR; INTRAVENOUS PRN
Status: DISCONTINUED | OUTPATIENT
Start: 2021-09-29 | End: 2021-09-29 | Stop reason: SDUPTHER

## 2021-09-29 RX ORDER — NEOSTIGMINE METHYLSULFATE 1 MG/ML
INJECTION, SOLUTION INTRAVENOUS PRN
Status: DISCONTINUED | OUTPATIENT
Start: 2021-09-29 | End: 2021-09-29 | Stop reason: SDUPTHER

## 2021-09-29 RX ORDER — PROPOFOL 10 MG/ML
INJECTION, EMULSION INTRAVENOUS PRN
Status: DISCONTINUED | OUTPATIENT
Start: 2021-09-29 | End: 2021-09-29 | Stop reason: SDUPTHER

## 2021-09-29 RX ORDER — CLINDAMYCIN HYDROCHLORIDE 150 MG/1
150 CAPSULE ORAL 3 TIMES DAILY
Qty: 30 CAPSULE | Refills: 1 | Status: SHIPPED | OUTPATIENT
Start: 2021-09-29 | End: 2021-10-09

## 2021-09-29 RX ORDER — ROCURONIUM BROMIDE 10 MG/ML
INJECTION, SOLUTION INTRAVENOUS PRN
Status: DISCONTINUED | OUTPATIENT
Start: 2021-09-29 | End: 2021-09-29 | Stop reason: SDUPTHER

## 2021-09-29 RX ORDER — ONDANSETRON 4 MG/1
4 TABLET, FILM COATED ORAL EVERY 8 HOURS PRN
Qty: 6 TABLET | Refills: 0 | Status: SHIPPED | OUTPATIENT
Start: 2021-09-29

## 2021-09-29 RX ORDER — ONDANSETRON 2 MG/ML
4 INJECTION INTRAMUSCULAR; INTRAVENOUS EVERY 6 HOURS PRN
Status: CANCELLED | OUTPATIENT
Start: 2021-09-29

## 2021-09-29 RX ORDER — CLINDAMYCIN PHOSPHATE 150 MG/ML
INJECTION, SOLUTION INTRAVENOUS PRN
Status: DISCONTINUED | OUTPATIENT
Start: 2021-09-29 | End: 2021-09-29 | Stop reason: SDUPTHER

## 2021-09-29 RX ADMIN — HYDROMORPHONE HYDROCHLORIDE 0.5 MG: 1 INJECTION, SOLUTION INTRAMUSCULAR; INTRAVENOUS; SUBCUTANEOUS at 10:25

## 2021-09-29 RX ADMIN — ROCURONIUM BROMIDE 20 MG: 10 INJECTION, SOLUTION INTRAVENOUS at 08:44

## 2021-09-29 RX ADMIN — MIDAZOLAM HYDROCHLORIDE 1 MG: 2 INJECTION, SOLUTION INTRAMUSCULAR; INTRAVENOUS at 08:12

## 2021-09-29 RX ADMIN — Medication 5 MG: at 09:53

## 2021-09-29 RX ADMIN — Medication 0.8 MG: at 09:53

## 2021-09-29 RX ADMIN — DEXAMETHASONE SODIUM PHOSPHATE 10 MG: 4 INJECTION, SOLUTION INTRAMUSCULAR; INTRAVENOUS at 08:44

## 2021-09-29 RX ADMIN — MIDAZOLAM HYDROCHLORIDE 1 MG: 1 INJECTION, SOLUTION INTRAMUSCULAR; INTRAVENOUS at 08:12

## 2021-09-29 RX ADMIN — METOPROLOL TARTRATE 2 MG: 5 INJECTION, SOLUTION INTRAVENOUS at 09:43

## 2021-09-29 RX ADMIN — FENTANYL CITRATE 50 MCG: 50 INJECTION, SOLUTION INTRAMUSCULAR; INTRAVENOUS at 09:29

## 2021-09-29 RX ADMIN — PROPOFOL 200 MG: 10 INJECTION, EMULSION INTRAVENOUS at 08:34

## 2021-09-29 RX ADMIN — SODIUM CHLORIDE: 9 INJECTION, SOLUTION INTRAVENOUS at 06:57

## 2021-09-29 RX ADMIN — FENTANYL CITRATE 50 MCG: 50 INJECTION, SOLUTION INTRAMUSCULAR; INTRAVENOUS at 10:12

## 2021-09-29 RX ADMIN — METOPROLOL TARTRATE 1 MG: 5 INJECTION, SOLUTION INTRAVENOUS at 08:53

## 2021-09-29 RX ADMIN — LIDOCAINE HYDROCHLORIDE 100 MG: 20 INJECTION, SOLUTION EPIDURAL; INFILTRATION; INTRACAUDAL; PERINEURAL at 08:34

## 2021-09-29 RX ADMIN — FENTANYL CITRATE 50 MCG: 50 INJECTION, SOLUTION INTRAMUSCULAR; INTRAVENOUS at 10:03

## 2021-09-29 RX ADMIN — FENTANYL CITRATE 50 MCG: 50 INJECTION, SOLUTION INTRAMUSCULAR; INTRAVENOUS at 08:44

## 2021-09-29 RX ADMIN — FENTANYL CITRATE 50 MCG: 50 INJECTION, SOLUTION INTRAMUSCULAR; INTRAVENOUS at 08:27

## 2021-09-29 RX ADMIN — FENTANYL CITRATE 50 MCG: 50 INJECTION, SOLUTION INTRAMUSCULAR; INTRAVENOUS at 08:51

## 2021-09-29 RX ADMIN — METOPROLOL TARTRATE 1 MG: 5 INJECTION, SOLUTION INTRAVENOUS at 08:48

## 2021-09-29 RX ADMIN — ONDANSETRON 4 MG: 2 INJECTION INTRAMUSCULAR; INTRAVENOUS at 09:17

## 2021-09-29 RX ADMIN — CLINDAMYCIN PHOSPHATE 600 MCG: 150 INJECTION, SOLUTION INTRAVENOUS at 08:40

## 2021-09-29 RX ADMIN — ROCURONIUM BROMIDE 10 MG: 10 INJECTION, SOLUTION INTRAVENOUS at 08:34

## 2021-09-29 RX ADMIN — Medication 140 MG: at 08:34

## 2021-09-29 RX ADMIN — HYDROMORPHONE HYDROCHLORIDE 0.5 MG: 1 INJECTION, SOLUTION INTRAMUSCULAR; INTRAVENOUS; SUBCUTANEOUS at 10:49

## 2021-09-29 ASSESSMENT — PULMONARY FUNCTION TESTS
PIF_VALUE: 16
PIF_VALUE: 18
PIF_VALUE: 17
PIF_VALUE: 18
PIF_VALUE: 17
PIF_VALUE: 16
PIF_VALUE: 13
PIF_VALUE: 18
PIF_VALUE: 17
PIF_VALUE: 14
PIF_VALUE: 16
PIF_VALUE: 18
PIF_VALUE: 17
PIF_VALUE: 16
PIF_VALUE: 14
PIF_VALUE: 17
PIF_VALUE: 17
PIF_VALUE: 14
PIF_VALUE: 0
PIF_VALUE: 16
PIF_VALUE: 17
PIF_VALUE: 1
PIF_VALUE: 17
PIF_VALUE: 24
PIF_VALUE: 17
PIF_VALUE: 1
PIF_VALUE: 16
PIF_VALUE: 16
PIF_VALUE: 15
PIF_VALUE: 1
PIF_VALUE: 2
PIF_VALUE: 17
PIF_VALUE: 17
PIF_VALUE: 18
PIF_VALUE: 2
PIF_VALUE: 0
PIF_VALUE: 1
PIF_VALUE: 16
PIF_VALUE: 16
PIF_VALUE: 17
PIF_VALUE: 32
PIF_VALUE: 18
PIF_VALUE: 18
PIF_VALUE: 17
PIF_VALUE: 17
PIF_VALUE: 18
PIF_VALUE: 16
PIF_VALUE: 17
PIF_VALUE: 18
PIF_VALUE: 17
PIF_VALUE: 17
PIF_VALUE: 3
PIF_VALUE: 6
PIF_VALUE: 17
PIF_VALUE: 17
PIF_VALUE: 14
PIF_VALUE: 18
PIF_VALUE: 17
PIF_VALUE: 16
PIF_VALUE: 23
PIF_VALUE: 14
PIF_VALUE: 18
PIF_VALUE: 17
PIF_VALUE: 17
PIF_VALUE: 0
PIF_VALUE: 17
PIF_VALUE: 16
PIF_VALUE: 18
PIF_VALUE: 0
PIF_VALUE: 19
PIF_VALUE: 17
PIF_VALUE: 1
PIF_VALUE: 17
PIF_VALUE: 14
PIF_VALUE: 17
PIF_VALUE: 18
PIF_VALUE: 16
PIF_VALUE: 17
PIF_VALUE: 19
PIF_VALUE: 18
PIF_VALUE: 17
PIF_VALUE: 1
PIF_VALUE: 16
PIF_VALUE: 17
PIF_VALUE: 3
PIF_VALUE: 4
PIF_VALUE: 17
PIF_VALUE: 16
PIF_VALUE: 1
PIF_VALUE: 19
PIF_VALUE: 17
PIF_VALUE: 17
PIF_VALUE: 18
PIF_VALUE: 1

## 2021-09-29 ASSESSMENT — PAIN SCALES - GENERAL
PAINLEVEL_OUTOF10: 5
PAINLEVEL_OUTOF10: 2
PAINLEVEL_OUTOF10: 5
PAINLEVEL_OUTOF10: 7
PAINLEVEL_OUTOF10: 7

## 2021-09-29 ASSESSMENT — PAIN DESCRIPTION - DESCRIPTORS
DESCRIPTORS: PATIENT UNABLE TO DESCRIBE
DESCRIPTORS: PATIENT UNABLE TO DESCRIBE

## 2021-09-29 ASSESSMENT — PAIN DESCRIPTION - LOCATION
LOCATION: EAR

## 2021-09-29 ASSESSMENT — PAIN DESCRIPTION - PAIN TYPE
TYPE: SURGICAL PAIN
TYPE: SURGICAL PAIN

## 2021-09-29 ASSESSMENT — PAIN DESCRIPTION - ORIENTATION
ORIENTATION: RIGHT

## 2021-09-29 ASSESSMENT — PAIN DESCRIPTION - ONSET
ONSET: GRADUAL
ONSET: ON-GOING

## 2021-09-29 ASSESSMENT — PAIN DESCRIPTION - PROGRESSION
CLINICAL_PROGRESSION: NOT CHANGED
CLINICAL_PROGRESSION: GRADUALLY IMPROVING
CLINICAL_PROGRESSION: GRADUALLY WORSENING
CLINICAL_PROGRESSION: GRADUALLY IMPROVING

## 2021-09-29 ASSESSMENT — PAIN DESCRIPTION - FREQUENCY: FREQUENCY: CONTINUOUS

## 2021-09-29 NOTE — ANESTHESIA PRE PROCEDURE
Department of Anesthesiology  Preprocedure Note       Name:  Pricila Foss   Age:  60 y.o.  :  1955                                          MRN:  [id number]         Date:  2021      Surgeon: Daniel Hinson):  Monica Galeas MD    Procedure: Procedure(s):  RIGHT PARTIAL AURICULECTOMY WITH FROZEN SECTION (PATHOLOGY PRESENT)    Medications prior to admission:   Prior to Admission medications    Medication Sig Start Date End Date Taking?  Authorizing Provider   cefdinir (OMNICEF) 300 MG capsule Take 300 mg by mouth 2 times daily   Yes Historical Provider, MD   oxyCODONE (ROXICODONE) 20 MG immediate release tablet TAKE ONE TABLET BY MOUTH EVERY 6 HOURS AS NEEDED 21  Yes Historical Provider, MD   zolpidem (AMBIEN) 10 MG tablet TAKE 1 TABLET BY MOUTH AT BEDTIME AS NEEDED 21  Yes Historical Provider, MD   apixaban (ELIQUIS) 5 MG TABS tablet Take by mouth 2 times daily   Yes Historical Provider, MD   latanoprost (XALATAN) 0.005 % ophthalmic solution Place 1 drop into both eyes nightly   Yes Historical Provider, MD   metoprolol succinate (TOPROL XL) 100 MG extended release tablet Take 200 mg by mouth daily In am   Yes Historical Provider, MD       Current medications:    Current Facility-Administered Medications   Medication Dose Route Frequency Provider Last Rate Last Admin    lactated ringers infusion   IntraVENous Continuous Monica Galeas MD        sodium chloride flush 0.9 % injection 5-40 mL  5-40 mL IntraVENous 2 times per day Monica Galeas MD        sodium chloride flush 0.9 % injection 5-40 mL  5-40 mL IntraVENous PRN Monica Galeas MD        0.9 % sodium chloride infusion  25 mL IntraVENous PRN Monica Galeas MD        clindamycin (CLEOCIN) 600 mg in dextrose 5 % 50 mL IVPB  600 mg IntraVENous On Call to [address] [place] [address] Avenue, MD           Allergies:  No Known Allergies    Problem List:    Patient Active Problem List   Diagnosis Code    Mass of ear auricle, 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    Persistent atrial fibrillation ([facility] I48.19    HTN (hypertension) I10       Past Medical History:        Diagnosis Date    A-fib ([facility]     Abscess     Right ear    Glaucoma     Hypertension        Past Surgical History:        Procedure Laterality Date    HERNIA REPAIR  1977    WISDOM TOOTH EXTRACTION         Social History:    Social History     Tobacco Use    Smoking status: Never Smoker    Smokeless tobacco: Never Used   Substance Use Topics    Alcohol use: Yes     Alcohol/week: 14.0 - 21.0 standard drinks     Types: 14 - 21 Cans of beer per week                                Counseling given: Not Answered      Vital Signs (Current):   Vitals:    09/27/21 1006 09/29/21 0700 09/29/21 0710   BP:  (!) 168/100 (!) 140/67   Pulse:  81    Resp:  14    Temp:  98.1 °F (36.7 °C)    TempSrc:  Temporal    SpO2:  98%    Weight: 225 lb (102.1 kg) 225 lb (102.1 kg)    Height: 5' 10\" (1.778 m) 5' 10\" (1.778 m)                                               BP Readings from Last 3 Encounters:   09/29/21 (!) 140/67   09/17/21 111/79   09/17/21 111/79       NPO Status: Time of last liquid consumption: 2200                        Time of last solid consumption: 1800                        Date of last liquid consumption: 09/28/21                        Date of last solid food consumption: 09/28/21    BMI:   Wt Readings from Last 3 Encounters:   09/29/21 225 lb (102.1 kg)   08/26/21 224 lb 9.6 oz (101.9 kg)   08/17/21 227 lb (103 kg)     Body mass index is 32.28 kg/m².     CBC:   Lab Results   Component Value Date    WBC 8.7 09/29/2021    RBC 4.96 09/29/2021    HGB 16.0 09/29/2021    HCT 49.0 09/29/2021    MCV 98.8 09/29/2021    RDW 13.8 09/29/2021     09/29/2021       CMP:   Lab Results   Component Value Date     08/17/2021    K 4.7 08/17/2021     08/17/2021    CO2 23 08/17/2021    BUN 12 08/17/2021    CREATININE 1.0 08/17/2021    GFRAA >60 08/17/2021    LABGLOM >60 08/17/2021    GLUCOSE 121 08/17/2021    PROT 6.9 08/11/2021    CALCIUM 9.0 08/17/2021    BILITOT 1.1 08/11/2021    ALKPHOS 94 08/11/2021    AST 35 08/11/2021    ALT 20 08/11/2021       POC Tests: No results for input(s): POCGLU, POCNA, POCK, POCCL, POCBUN, POCHEMO, POCHCT in the last 72 hours. Coags: No results found for: PROTIME, INR, APTT    HCG (If Applicable): No results found for: PREGTESTUR, PREGSERUM, HCG, HCGQUANT     ABGs: No results found for: PHART, PO2ART, ZRI4PXA, ADN0JJC, BEART, A1RXEHZJ     Type & Screen (If Applicable):  No results found for: LABABO, LABRH    Drug/Infectious Status (If Applicable):  No results found for: HIV, HEPCAB    COVID-19 Screening (If Applicable): No results found for: COVID19        Anesthesia Evaluation  Patient summary reviewed no history of anesthetic complications:   Airway: Mallampati: III  TM distance: <3 FB     Mouth opening: < 3 FB Dental:          Pulmonary:   (+) decreased breath sounds,      (-) wheezes and not a current smoker                          ROS comment: Probable TALI   Cardiovascular:    (+) hypertension:, dysrhythmias (took toprol XL this morning): atrial flutter,         Rhythm: irregular                   ROS comment: This surgery was originally scheduled in Aug 2021 but cancelled due to new onset Afib. Subsequent cardioversion attempt was unsuccessful by Cardiology. Cardiology recommended stopping anticoagulation for 48 hrs before surgery and to proceed with the planned procedure. Neuro/Psych:                ROS comment: Glaucoma - bilateral eyes GI/Hepatic/Renal: Neg GI/Hepatic/Renal ROS            Endo/Other:    (+) blood dyscrasia: anticoagulation therapy:., .                  ROS comment: Chronic opioids for past month - 2-3 times daily for right ear pain Abdominal:   (+) obese,           Vascular: negative vascular ROS. Other Findings:             Anesthesia Plan      general     ASA 3       Induction: intravenous and rapid sequence.       Anesthetic plan and risks discussed with patient. Plan discussed with CRNA.                 Chon Hinson MD   9/29/2021

## 2021-09-29 NOTE — ANESTHESIA POSTPROCEDURE EVALUATION
Department of Anesthesiology  Postprocedure Note    Patient: Destin Parker  MRN: [id number]  YOB: 1955  Date of evaluation: 9/29/2021  Time:  7:27 PM     Procedure Summary     Date: 09/29/21 Room / Location: [facility] / [facility]    Anesthesia Start: 8366 Anesthesia Stop: 9296    Procedure: RIGHT PARTIAL AURICULECTOMY WITH FROZEN SECTION (PATHOLOGY PRESENT) (Right ) Diagnosis: (AURICULAR ABSCESS)    Surgeons: Ang Chung MD Responsible Provider: Faizan Jeter MD    Anesthesia Type: general ASA Status: 3          Anesthesia Type: general    Swetha Phase I: Swetha Score: 8    Swetha Phase II: Swetha Score: 10    Last vitals: Reviewed and per EMR flowsheets.        Anesthesia Post Evaluation    Patient location during evaluation: PACU  Patient participation: complete - patient participated  Level of consciousness: awake  Airway patency: patent  Nausea & Vomiting: no vomiting and no nausea  Complications: no  Cardiovascular status: hemodynamically stable  Respiratory status: acceptable  Hydration status: stable

## 2021-09-29 NOTE — BRIEF OP NOTE
Brief Postoperative Note      Patient: Deanna Quintana  YOB: 1955  MRN: [id number]    Date of Procedure: 9/29/2021    Pre-Op Diagnosis: right auricular cellulitis, chondritis    Post-Op Diagnosis: Same       Procedure(s):  RIGHT PARTIAL AURICULECTOMY WITH FROZEN SECTION (PATHOLOGY PRESENT)    Surgeon(s):  Nita Auguste MD    Assistant:  * No surgical staff found *    Anesthesia: General    Estimated Blood Loss (mL): 30 cc    Complications: None    Specimens:   ID Type Source Tests Collected by Time Destination   1 : Right auricle culture Tissue Tissue CULTURE, ANAEROBIC, CULTURE, FUNGUS, GRAM STAIN, CULTURE, SURGICAL, CULTURE WITH SMEAR, ACID FAST Umair Carrera MD 9/29/2021 3479    A : Right partial auricle Tissue Tissue SURGICAL PATHOLOGY Nita Auguste MD 9/29/2021 9455    B : Superior auricular cartilage Tissue Tissue SURGICAL PATHOLOGY Nita Auguste MD 9/29/2021 6763        Implants:  * No implants in log *      Drains: * No LDAs found *    Findings: necrotic skin/ cartilage right auricle    Electronically signed by Nita Auguste MD on 9/29/2021 at 10:06 AM

## 2021-09-30 LAB — GRAM STAIN ORDERABLE: NORMAL

## 2021-09-30 NOTE — OP NOTE
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 PROCEDURE PERFORMED:  Right ear total auriculectomy with complex  closure. SURGEON:  Leelee Lowry MD.    ASSISTANT:  None. ANESTHESIA:  General per endotracheal tube. BLOOD LOSS:  30 mL. COMPLICATIONS:  None. INDICATION:  Necrotic skin cartilage, right auricle. DESCRIPTION OF PROCEDURE:  The patient was brought to the operating room  and placed in the supine position. Endotracheal tube passed, general  anesthesia administered. The ear was prepped with Betadine solution. It should be noted that even prior to prepping, it was just gently even  touching the ear, the necrotic crusting material on the skin would begin  to ooze. The lesion involved the conchal bowl, the _____ inferior  aspect of the auricle with a small area of the superior rim, and  antihelical folds preserved. The tragus was not involved. The external  auditory canal was preserved to the level of the meatus. The area  around the auricle was infiltrated using a total of 2 mL of 1% Xylocaine  with 1:100,000 epinephrine being mindful of the patient's history of  atrial fibrillation. I started along the inferior aspect of the auricle  making an incision down to the subcutaneous tissue, going along the root  of the helix, and then going posterior. Cartilaginous cut was made up.    Skin and cartilaginous cut on the posterior surface, uninvolved skin  surface superiorly. This was then carried anteriorly. The conchal bowl  was largely resected. This went to the external auditory canal meatus,  coming up, not involving the tragus, and then connecting anterior and  the specimen was removed as right auricle. There was some mucoid fluid  present, which I did culture as well as sent for anaerobic and aerobic  cultures. I subsequently mobilized large flap posteriorly undermining  the skin. Some inferiorly as well. The remnant of the auricle  superiorly, I debrided cartilage back from the skin edges and then used  a running 4-0 Prolene to close the skin defect on the superior aspect. Using advancement flap technique, I was able to advance the posterior  skin up to the posterior aspect of the external auditory canal meatus. A running 4-0 Prolene was utilized to close the skin defect inferiorly  to itself. There appeared to be adequate closure. I packed the  external auditory canal and the meatal area with Xeroform gauze. Pressure dressing was applied using a mastoid type dressing on the  right. Bactroban ointment was applied to all surfaces just prior to the  dressing. The patient was thoroughly suctioned out, awakened,  extubated, and taken to the recovery room in stable condition. No  intraoperative complications.         Fercho Winchester MD    D: 09/29/2021 10:16:06       T: 09/29/2021 14:27:03     [email or web address]  Job#: [id number]     Doc#: [id number]    CC:

## 2021-10-03 LAB
ANAEROBIC CULTURE: ABNORMAL
CULTURE SURGICAL: ABNORMAL
CULTURE SURGICAL: ABNORMAL
ORGANISM: ABNORMAL

## 2022-08-29 ENCOUNTER — HOSPITAL ENCOUNTER (OUTPATIENT)
Dept: ULTRASOUND IMAGING | Age: 67
Discharge: HOME | End: 2022-08-31
Payer: MEDICARE

## 2022-08-29 DIAGNOSIS — R10.9 STOMACH ACHE: ICD-10-CM

## 2022-08-29 DIAGNOSIS — R94.5 ABNORMAL FINDING ON LIVER FUNCTION: ICD-10-CM

## 2022-08-29 PROCEDURE — 76705 ECHO EXAM OF ABDOMEN: CPT

## 2023-11-09 ENCOUNTER — HOSPITAL ENCOUNTER (OUTPATIENT)
Dept: ULTRASOUND IMAGING | Age: 68
Discharge: HOME | End: 2023-11-11
Payer: MEDICARE

## 2023-11-09 DIAGNOSIS — D48.5 NEOPLASM OF UNCERTAIN BEHAVIOR OF SKIN: ICD-10-CM

## 2023-11-09 PROCEDURE — 76882 US LMTD JT/FCL EVL NVASC XTR: CPT

## 2024-05-17 ENCOUNTER — TRANSCRIBE ORDERS (OUTPATIENT)
Dept: ADMINISTRATIVE | Age: 69
End: 2024-05-17

## 2024-05-17 DIAGNOSIS — R94.5 ABNORMAL RESULTS OF LIVER FUNCTION STUDIES: Primary | ICD-10-CM

## 2024-05-21 ENCOUNTER — HOSPITAL ENCOUNTER (OUTPATIENT)
Dept: ULTRASOUND IMAGING | Age: 69
Discharge: HOME | End: 2024-05-23
Payer: MEDICARE

## 2024-05-21 DIAGNOSIS — R94.5 ABNORMAL RESULTS OF LIVER FUNCTION STUDIES: ICD-10-CM

## 2024-05-21 PROCEDURE — 76705 ECHO EXAM OF ABDOMEN: CPT

## (undated) DEVICE — TOWEL,OR,DSP,ST,BLUE,STD,6/PK,12PK/CS: Brand: MEDLINE

## (undated) DEVICE — SURGICAL PROCEDURE PACK EENT CUST

## (undated) DEVICE — SOLUTION IV IRRIG POUR BRL 0.9% SODIUM CHL 2F7124

## (undated) DEVICE — CLEANER,CAUTERY TIP,2X2",STERILE: Brand: MEDLINE

## (undated) DEVICE — 4-PORT MANIFOLD: Brand: NEPTUNE 2

## (undated) DEVICE — GAUZE,SPONGE,4"X4",8PLY,STRL,LF,10/TRAY: Brand: MEDLINE

## (undated) DEVICE — DRESSING,GAUZE,XEROFORM,CURAD,1"X8",ST: Brand: CURAD

## (undated) DEVICE — BASIC SINGLE BASIN 1-LF: Brand: MEDLINE INDUSTRIES, INC.

## (undated) DEVICE — SET SURG BASIN MAYO REUSABLE

## (undated) DEVICE — COUNTER NDL 30 COUNT DBL MAG

## (undated) DEVICE — TRAY SET AMBULATORY INSTRUMENT S REUSABLE

## (undated) DEVICE — MARKER,SKIN,WI/RULER AND LABELS: Brand: MEDLINE

## (undated) DEVICE — SPONGE,LAP,4"X18",XR,ST,5/PK,40PK/CS: Brand: MEDLINE INDUSTRIES, INC.

## (undated) DEVICE — NEEDLE FLTR 18GA L1.5IN MEM THK5UM BLNT DISP

## (undated) DEVICE — LIGHT SOURCE WHT

## (undated) DEVICE — INTENDED FOR TISSUE SEPARATION, AND OTHER PROCEDURES THAT REQUIRE A SHARP SURGICAL BLADE TO PUNCTURE OR CUT.: Brand: BARD-PARKER ® STAINLESS STEEL BLADES

## (undated) DEVICE — ELECTRODE PT RET AD L9FT HI MOIST COND ADH HYDRGEL CORDED

## (undated) DEVICE — GLOVE ORANGE PI 7 1/2   MSG9075

## (undated) DEVICE — ELECTRODE ELECSURG NDL 2.8 INX7.2 CM COAT INSUL EDGE

## (undated) DEVICE — NEEDLE HYPO 25GA L1.5IN BLU POLYPR HUB S STL REG BVL STR